# Patient Record
Sex: FEMALE | Race: WHITE | NOT HISPANIC OR LATINO | Employment: UNEMPLOYED | URBAN - METROPOLITAN AREA
[De-identification: names, ages, dates, MRNs, and addresses within clinical notes are randomized per-mention and may not be internally consistent; named-entity substitution may affect disease eponyms.]

---

## 2019-02-20 ENCOUNTER — HOSPITAL ENCOUNTER (OUTPATIENT)
Dept: URGENT CARE | Facility: CLINIC | Age: 24
Discharge: HOME | End: 2019-02-20
Attending: FAMILY MEDICINE

## 2020-03-16 ENCOUNTER — HOSPITAL ENCOUNTER (OUTPATIENT)
Dept: URGENT CARE | Facility: CLINIC | Age: 25
Discharge: HOME | End: 2020-03-16
Attending: PHYSICIAN ASSISTANT

## 2023-07-10 PROBLEM — O34.00 BICORNUATE UTERUS AFFECTING PREGNANCY, ANTEPARTUM: Status: ACTIVE | Noted: 2023-07-10

## 2023-07-10 PROBLEM — Z34.80 SUPERVISION OF OTHER NORMAL PREGNANCY, ANTEPARTUM: Status: ACTIVE | Noted: 2023-07-10

## 2023-07-10 PROBLEM — Q51.3 BICORNUATE UTERUS AFFECTING PREGNANCY, ANTEPARTUM: Status: ACTIVE | Noted: 2023-07-10

## 2023-07-10 PROBLEM — O09.299 HX OF PREECLAMPSIA, PRIOR PREGNANCY, CURRENTLY PREGNANT: Status: ACTIVE | Noted: 2023-07-10

## 2023-07-10 PROBLEM — Z72.0 TOBACCO ABUSE: Status: ACTIVE | Noted: 2023-07-10

## 2023-08-10 ENCOUNTER — ROUTINE PRENATAL (OUTPATIENT)
Dept: OBSTETRICS AND GYNECOLOGY | Facility: CLINIC | Age: 28
End: 2023-08-10
Payer: COMMERCIAL

## 2023-08-10 VITALS — WEIGHT: 158.8 LBS | DIASTOLIC BLOOD PRESSURE: 79 MMHG | BODY MASS INDEX: 28.13 KG/M2 | SYSTOLIC BLOOD PRESSURE: 119 MMHG

## 2023-08-10 DIAGNOSIS — K59.00 CONSTIPATION, UNSPECIFIED CONSTIPATION TYPE: ICD-10-CM

## 2023-08-10 DIAGNOSIS — O34.00 BICORNUATE UTERUS AFFECTING PREGNANCY, ANTEPARTUM: ICD-10-CM

## 2023-08-10 DIAGNOSIS — R11.2 NAUSEA AND VOMITING, UNSPECIFIED VOMITING TYPE: ICD-10-CM

## 2023-08-10 DIAGNOSIS — Z72.0 TOBACCO ABUSE: ICD-10-CM

## 2023-08-10 DIAGNOSIS — Q51.3 BICORNUATE UTERUS AFFECTING PREGNANCY, ANTEPARTUM: ICD-10-CM

## 2023-08-10 DIAGNOSIS — Z34.90 ENCOUNTER FOR SUPERVISION OF NORMAL PREGNANCY, ANTEPARTUM, UNSPECIFIED GRAVIDITY: Primary | ICD-10-CM

## 2023-08-10 DIAGNOSIS — O09.299 HX OF PREECLAMPSIA, PRIOR PREGNANCY, CURRENTLY PREGNANT: ICD-10-CM

## 2023-08-10 LAB
GLUCOSE UR STRIP-MCNC: NEGATIVE MG/DL
PROT UR STRIP-MCNC: NEGATIVE MG/DL

## 2023-08-10 RX ORDER — DOCUSATE SODIUM 100 MG/1
100 CAPSULE, LIQUID FILLED ORAL 2 TIMES DAILY PRN
Qty: 30 CAPSULE | Refills: 0 | Status: SHIPPED | OUTPATIENT
Start: 2023-08-10

## 2023-08-10 RX ORDER — ONDANSETRON 4 MG/1
4 TABLET, ORALLY DISINTEGRATING ORAL EVERY 8 HOURS PRN
Qty: 30 TABLET | Refills: 0 | Status: SHIPPED | OUTPATIENT
Start: 2023-08-10

## 2023-08-10 NOTE — PROGRESS NOTES
Routine Prenatal Visit     Subjective  Maylin Abraham is a 28 y.o.  at 10w0d here for her routine OB visit.   She is taking her prenatal vitamins.Reports no loss of fluid or vaginal bleeding. Patient doing well but does complain of some constipation and nausea. Pregnancy complicated by:     Patient Active Problem List   Diagnosis    Supervision of other normal pregnancy, antepartum    Hx of preeclampsia, prior pregnancy, currently pregnant    Tobacco abuse    Bicornuate uterus affecting pregnancy, antepartum         OB History    Para Term  AB Living   3 1 1   1 1   SAB IAB Ectopic Molar Multiple Live Births   1         1      # Outcome Date GA Lbr Jeremiah/2nd Weight Sex Delivery Anes PTL Lv   3 Current            2 SAB 23     SAB      1 Term 20 38w0d  2722 g (6 lb) M Vag-Spont   NOLAN       ROS:   General ROS: negative for - chills or fatigue  Respiratory ROS: negative for - cough or hemoptysis  Cardiovascular ROS: negative for - chest pain or dyspnea on exertion  Genito-Urinary ROS: negative for  change in urinary stream, vaginal discharge   Musculoskeletal ROS: negative for - gait disturbance or joint pain  Dermatological ROS: negative for acne,  dry skin or itching    Objective  Physical Exam:   Vitals:    08/10/23 1504   BP: 119/79       Uterine Size: not examined, US today  FHT: 110-160 BPM    General appearance - alert, well appearing, and in no distress  Mental status - alert, oriented to person, place, and time  Abdomen- Soft, Gravid uterus, non-tender to palpation  Musculoskeletal: negative for - gait disturbance or joint pain  Extremeties: negative swelling or cyanosis   Dermatological: negative rashes or skin lesions     Assessment/Plan  Diagnoses and all orders for this visit:    1. Encounter for supervision of normal pregnancy, antepartum, unspecified  (Primary)  -     POC Urinalysis Dipstick  -     US Ob 14 + Weeks Single or First Gestation; Future  -      [id number] PLUS Core+SCA+ESS - Blood,  -     Inheritest (R) CF/SMA Panel - Blood,; Future  -     Inheritest (R) CF/SMA Panel - Blood,    2. Hx of preeclampsia, prior pregnancy, currently pregnant  Assessment & Plan:  Baseline labs new OB   ASA 81mg Daily       3. Bicornuate uterus affecting pregnancy, antepartum    4. Tobacco abuse    5. Nausea and vomiting, unspecified vomiting type  -     ondansetron ODT (ZOFRAN-ODT) 4 MG disintegrating tablet; Place 1 tablet on the tongue Every 8 (Eight) Hours As Needed for Nausea or Vomiting.  Dispense: 30 tablet; Refill: 0    6. Constipation, unspecified constipation type  -     docusate sodium (Colace) 100 MG capsule; Take 1 capsule by mouth 2 (Two) Times a Day As Needed for Constipation.  Dispense: 30 capsule; Refill: 0            Counseling:   First trimester precautions, bleeding, cramping, nausea and vomiting  Round Ligament Pain:  The uterus has several ligaments which provide support and keep the uterus in place. As the  uterus grows these ligaments are pulled and stretched which often causes sharp stabbing like pain in the inguinal area.   You may find a pregnancy support band helpful. Changing positions may also help. Yoga is a great way to cope with round ligament and low back pain in pregnancy.    Massage may also help with low back pain   Things to Consider at this Point in your Pregnancy:  Some women experience swelling in their feet during pregnancy. Compression stockings may help  Drink plenty of water and stay active   Make sure you are eating frequent small meals, nuts are a wonderful snack to keep with you            Return in about 4 weeks (around 9/7/2023) for Routine OB visit.      We have gone over prenatal care to include the timing and content of visits. I informed her how to contact the office and/or on call person in the event of any problems and encouraged her to do so when she feels it is necessary.  We then spent time answering her questions which  she indicated were answered to her satisfaction.    Megan Rivero DO  8/10/2023 15:17 EDT

## 2023-08-10 NOTE — ASSESSMENT & PLAN NOTE
JOSEFINA finalize:Estimated Date of Delivery: 3/7/24 based on LMP, dating US ordered       Genetic testing (NIPS-Quad)/CF/AFP:  undecided     COVID: recommended   Flu: recommended   Tdap:script 27-36 weeks     Rhogam:     Sterilization:    Anatomy US:  FU US:    EPDS: 2    PROBLEM LIST/PLAN:   Tobacco abuse-cessation counseled   Bicornuate uterus   Hx of preeclampsia-81mg ASA at 12 weeks, baseline labs

## 2023-08-10 NOTE — PATIENT INSTRUCTIONS
Venipuncture Blood Specimen Collection  Venipuncture performed in left arm by Denise Sahu with good hemostasis. Patient tolerated the procedure well without complications.   08/10/23   Denise Sahu

## 2023-08-24 LAB
CITATION REF LAB TEST: NORMAL
ETHNIC BACKGROUND STATED: NORMAL
GENE DIS ANL CARRIER INTERP-IMP: NORMAL
GENE STUDIED ID: NORMAL
LAB DIRECTOR NAME PROVIDER: NORMAL
Lab: NORMAL
MOL DX INTERP BLD/T QL: NORMAL
REASON FOR REFERRAL (NARRATIVE): NORMAL
RECOMMENDATION PATIENT DOC-IMP: NORMAL
REF LAB TEST METHOD: NORMAL
SERVICE CMNT-IMP: NORMAL
SPECIMEN SOURCE: NORMAL

## 2023-09-07 ENCOUNTER — ROUTINE PRENATAL (OUTPATIENT)
Dept: OBSTETRICS AND GYNECOLOGY | Facility: CLINIC | Age: 28
End: 2023-09-07
Payer: COMMERCIAL

## 2023-09-07 ENCOUNTER — REFERRAL TRIAGE (OUTPATIENT)
Dept: LABOR AND DELIVERY | Facility: HOSPITAL | Age: 28
End: 2023-09-07
Payer: COMMERCIAL

## 2023-09-07 VITALS — WEIGHT: 154 LBS | SYSTOLIC BLOOD PRESSURE: 118 MMHG | BODY MASS INDEX: 27.28 KG/M2 | DIASTOLIC BLOOD PRESSURE: 72 MMHG

## 2023-09-07 DIAGNOSIS — R11.2 NAUSEA AND VOMITING, UNSPECIFIED VOMITING TYPE: ICD-10-CM

## 2023-09-07 DIAGNOSIS — Z34.80 SUPERVISION OF OTHER NORMAL PREGNANCY, ANTEPARTUM: ICD-10-CM

## 2023-09-07 DIAGNOSIS — O34.00 BICORNUATE UTERUS AFFECTING PREGNANCY, ANTEPARTUM: ICD-10-CM

## 2023-09-07 DIAGNOSIS — Q51.3 BICORNUATE UTERUS AFFECTING PREGNANCY, ANTEPARTUM: ICD-10-CM

## 2023-09-07 DIAGNOSIS — Z72.0 TOBACCO ABUSE: ICD-10-CM

## 2023-09-07 DIAGNOSIS — Z34.90 ENCOUNTER FOR SUPERVISION OF NORMAL PREGNANCY, ANTEPARTUM, UNSPECIFIED GRAVIDITY: Primary | ICD-10-CM

## 2023-09-07 DIAGNOSIS — O09.299 HX OF PREECLAMPSIA, PRIOR PREGNANCY, CURRENTLY PREGNANT: ICD-10-CM

## 2023-09-07 LAB
GLUCOSE UR STRIP-MCNC: NEGATIVE MG/DL
PROT UR STRIP-MCNC: ABNORMAL MG/DL

## 2023-09-07 RX ORDER — ONDANSETRON 8 MG/1
8 TABLET, ORALLY DISINTEGRATING ORAL EVERY 8 HOURS PRN
Qty: 30 TABLET | Refills: 3 | Status: SHIPPED | OUTPATIENT
Start: 2023-09-07

## 2023-09-07 RX ORDER — ALBUTEROL SULFATE 90 UG/1
AEROSOL, METERED RESPIRATORY (INHALATION)
COMMUNITY
Start: 2023-08-22

## 2023-09-07 NOTE — PROGRESS NOTES
Routine Prenatal Visit     Subjective  Maylin Abraham is a 28 y.o.  at 14w0d here for her routine OB visit.   She is taking her prenatal vitamins.Reports no loss of fluid or vaginal bleeding. Patient doing well without any complaints. Pregnancy complicated by:     Patient Active Problem List   Diagnosis    Supervision of other normal pregnancy, antepartum    Hx of preeclampsia, prior pregnancy, currently pregnant    Tobacco abuse    Bicornuate uterus affecting pregnancy, antepartum         OB History    Para Term  AB Living   3 1 1   1 1   SAB IAB Ectopic Molar Multiple Live Births   1         1      # Outcome Date GA Lbr Jeremiah/2nd Weight Sex Delivery Anes PTL Lv   3 Current            2 SAB 23     SAB      1 Term 20 38w0d  2722 g (6 lb) M Vag-Spont   NOLAN       ROS:   General ROS: negative for - chills or fatigue  Respiratory ROS: negative for - cough or hemoptysis  Cardiovascular ROS: negative for - chest pain or dyspnea on exertion  Genito-Urinary ROS: negative for  change in urinary stream, vaginal discharge   Musculoskeletal ROS: negative for - gait disturbance or joint pain  Dermatological ROS: negative for acne,  dry skin or itching    Objective  Physical Exam:   Vitals:    23 1432   BP: 118/72       Uterine Size: not examined, US today  FHT: 110-160 BPM via BSUS     General appearance - alert, well appearing, and in no distress  Mental status - alert, oriented to person, place, and time  Abdomen- Soft, Gravid uterus, non-tender to palpation  Musculoskeletal: negative for - gait disturbance or joint pain  Extremeties: negative swelling or cyanosis   Dermatological: negative rashes or skin lesions       Assessment/Plan  Diagnoses and all orders for this visit:    1. Encounter for supervision of normal pregnancy, antepartum, unspecified  (Primary)  -     POC Urinalysis Dipstick    2. Bicornuate uterus affecting pregnancy, antepartum    3. Hx of preeclampsia,  prior pregnancy, currently pregnant    4. Supervision of other normal pregnancy, antepartum  Assessment & Plan:  JOSEFINA finalize:Estimated Date of Delivery: 3/7/24 based on LMP, dating US ordered       Genetic testing (NIPS-Quad)/CF/AFP:  undecided     COVID: recommended   Flu: recommended   Tdap:script 27-36 weeks     Rhogam: A positive     Sterilization:    Anatomy US:  FU US:    EPDS: 2    PROBLEM LIST/PLAN:   Tobacco abuse-cessation counseled   Bicornuate uterus   Hx of preeclampsia-81mg ASA at 12 weeks, baseline labs       5. Tobacco abuse    6. Nausea and vomiting, unspecified vomiting type  -     ondansetron ODT (ZOFRAN-ODT) 8 MG disintegrating tablet; Place 1 tablet on the tongue Every 8 (Eight) Hours As Needed for Nausea or Vomiting.  Dispense: 30 tablet; Refill: 3            Counseling:   First trimester precautions, bleeding, cramping, nausea and vomiting  Round Ligament Pain:  The uterus has several ligaments which provide support and keep the uterus in place. As the  uterus grows these ligaments are pulled and stretched which often causes sharp stabbing like pain in the inguinal area.   You may find a pregnancy support band helpful. Changing positions may also help. Yoga is a great way to cope with round ligament and low back pain in pregnancy.    Massage may also help with low back pain   Things to Consider at this Point in your Pregnancy:  Some women experience swelling in their feet during pregnancy. Compression stockings may help  Drink plenty of water and stay active   Make sure you are eating frequent small meals, nuts are a wonderful snack to keep with you            Return in about 4 weeks (around 10/5/2023) for Routine OB visit.      We have gone over prenatal care to include the timing and content of visits. I informed her how to contact the office and/or on call person in the event of any problems and encouraged her to do so when she feels it is necessary.  We then spent time answering her  questions which she indicated were answered to her satisfaction.    Megan Rivero, DO  9/7/2023 14:44 EDT

## 2023-09-07 NOTE — ASSESSMENT & PLAN NOTE
JOSEFINA finalize:Estimated Date of Delivery: 3/7/24 based on LMP, dating US ordered       Genetic testing (NIPS-Quad)/CF/AFP:  undecided     COVID: recommended   Flu: recommended   Tdap:script 27-36 weeks     Rhogam: A positive     Sterilization:    Anatomy US:  FU US:    EPDS: 2    PROBLEM LIST/PLAN:   Tobacco abuse-cessation counseled   Bicornuate uterus   Hx of preeclampsia-81mg ASA at 12 weeks, baseline labs

## 2023-09-08 ENCOUNTER — CLINICAL SUPPORT (OUTPATIENT)
Dept: OBSTETRICS AND GYNECOLOGY | Facility: CLINIC | Age: 28
End: 2023-09-08
Payer: COMMERCIAL

## 2023-09-08 ENCOUNTER — TELEPHONE (OUTPATIENT)
Dept: OBSTETRICS AND GYNECOLOGY | Facility: CLINIC | Age: 28
End: 2023-09-08
Payer: COMMERCIAL

## 2023-09-08 DIAGNOSIS — Z34.90 ENCOUNTER FOR SUPERVISION OF NORMAL PREGNANCY, ANTEPARTUM, UNSPECIFIED GRAVIDITY: Primary | ICD-10-CM

## 2023-09-08 LAB
CREAT UR-MCNC: 326.1 MG/DL
PROT ?TM UR-MCNC: 21.6 MG/DL
PROT/CREAT UR: 0.07 MG/G{CREAT}

## 2023-09-08 PROCEDURE — 82570 ASSAY OF URINE CREATININE: CPT | Performed by: OBSTETRICS & GYNECOLOGY

## 2023-09-08 PROCEDURE — 84156 ASSAY OF PROTEIN URINE: CPT | Performed by: OBSTETRICS & GYNECOLOGY

## 2023-09-08 NOTE — TELEPHONE ENCOUNTER
----- Message from Lacie Hubbard sent at 9/8/2023 12:04 PM EDT -----    ----- Message -----  From: Megan Rivero DO  Sent: 9/7/2023   4:21 PM EDT  To: Lacie Hubbard    If we still have this can we send it for [facility] since it was 1+    Electronically signed by:    Megan Rivero DO  09/07/23  16:21 EDT      ----- Message -----  From: Karen Jacob MA  Sent: 9/7/2023   2:48 PM EDT  To: Megan Rivero DO

## 2023-09-08 NOTE — TELEPHONE ENCOUNTER
I called the patient and let her know that you would like for her to come in and leave another urine sample for testing.  However, she lives an hour away and wants to know if this can be done at her next follow up on 10/05/2023?  Please advise.

## 2023-09-08 NOTE — TELEPHONE ENCOUNTER
I called the patient back to let her know this information and she said she found a way to be able to make it up here today so she is on her way now to leave a urine sample. Thanks.

## 2023-09-08 NOTE — PROGRESS NOTES
Patient came in to leave a clean catch urine specimen for the purpose of doing a protein and creatinine ratio per Dr. Rivero.

## 2023-10-04 NOTE — ASSESSMENT & PLAN NOTE
JOSEFINA finalize:Estimated Date of Delivery: 3/7/24 based on LMP, dating US ordered       Genetic testing (NIPS-Quad)/CF/AFP:  undecided     COVID: recommended   Flu: recommended   Tdap:script 27-36 weeks     Rhogam: A positive     Sterilization:    Anatomy US:ORDERED  FU US:    EPDS: 2    PROBLEM LIST/PLAN:   Tobacco abuse-cessation counseled   Bicornuate uterus   Hx of preeclampsia-81mg ASA at 12 weeks, baseline labs

## 2023-10-04 NOTE — PROGRESS NOTES
Routine Prenatal Visit     Subjective  Maylin Abraham is a 28 y.o.  at 18w0d here for her routine OB visit.   She is taking her prenatal vitamins.Reports no loss of fluid or vaginal bleeding. Patient doing well without any complaints. Pregnancy complicated by:     Patient Active Problem List   Diagnosis    Supervision of other normal pregnancy, antepartum    Hx of preeclampsia, prior pregnancy, currently pregnant    Tobacco abuse    Bicornuate uterus affecting pregnancy, antepartum         OB History    Para Term  AB Living   3 1 1   1 1   SAB IAB Ectopic Molar Multiple Live Births   1         1      # Outcome Date GA Lbr Jeremiah/2nd Weight Sex Delivery Anes PTL Lv   3 Current            2 SAB 23     SAB      1 Term 20 38w0d  2722 g (6 lb) M Vag-Spont   NOLAN           ROS:   General ROS: negative for - chills or fatigue  Respiratory ROS: negative for - cough or hemoptysis  Cardiovascular ROS: negative for - chest pain or dyspnea on exertion  Genito-Urinary ROS: negative for  change in urinary stream, vaginal discharge   Musculoskeletal ROS: negative for - gait disturbance or joint pain  Dermatological ROS: negative for acne,  dry skin or itching    Objective  Physical Exam:   Vitals:    10/05/23 1410   BP: 110/78     FHT: 110-160 BPM    General appearance - alert, well appearing, and in no distress  Mental status - alert, oriented to person, place, and time  Abdomen- Soft, Gravid uterus, non-tender to palpation  Musculoskeletal: negative for - gait disturbance or joint pain  Extremeties: negative swelling or cyanosis   Dermatological: negative rashes or skin lesions     Assessment/Plan:   Diagnoses and all orders for this visit:    1. Encounter for supervision of normal pregnancy, antepartum, unspecified  (Primary)  -     POC Urinalysis Dipstick    2. Hx of preeclampsia, prior pregnancy, currently pregnant    3. Supervision of other normal pregnancy, antepartum  Assessment &  Plan:  JOSEFINA finalize:Estimated Date of Delivery: 3/7/24 based on LMP, dating US ordered       Genetic testing (NIPS-Quad)/CF/AFP:  undecided     COVID: recommended   Flu: recommended   Tdap:script 27-36 weeks     Rhogam: A positive     Sterilization:    Anatomy US:ORDERED  FU US:    EPDS: 2    PROBLEM LIST/PLAN:   Tobacco abuse-cessation counseled   Bicornuate uterus   Hx of preeclampsia-81mg ASA at 12 weeks, baseline labs       4. Tobacco abuse    5. Bicornuate uterus affecting pregnancy, antepartum  Assessment & Plan:  US on 8/10/23 showed possible bicornuate uterus               Counseling:   Second trimester precautions  Round Ligament Pain:  The uterus has several ligaments which provide support and keep the uterus in place. As the  uterus grows these ligaments are pulled and stretched which often causes sharp stabbing like pain in the inguinal area.   You may find a pregnancy support band helpful. Changing positions may also help. Yoga is a great way to cope with round ligament and low back pain in pregnancy.    Massage may also help with low back pain   Things to Consider at this Point in your Pregnancy:  Some women experience swelling in their feet during pregnancy. Compression stockings may help  Drink plenty of water and stay active   Make sure you are eating frequent small meals, nuts are a wonderful snack to keep with you            Return in about 4 weeks (around 11/2/2023) for Routine OB visit.      We have gone over prenatal care to include the timing and content of visits. I informed her how to contact the office and/or on call person in the event of any problems and encouraged her to do so when she feels it is necessary.  We then spent time answering her questions which she indicated were answered to her satisfaction.    Megan Rivero DO  10/5/2023 14:55 EDT

## 2023-10-05 ENCOUNTER — ROUTINE PRENATAL (OUTPATIENT)
Dept: OBSTETRICS AND GYNECOLOGY | Facility: CLINIC | Age: 28
End: 2023-10-05
Payer: COMMERCIAL

## 2023-10-05 VITALS — DIASTOLIC BLOOD PRESSURE: 78 MMHG | SYSTOLIC BLOOD PRESSURE: 110 MMHG | BODY MASS INDEX: 27.24 KG/M2 | WEIGHT: 153.8 LBS

## 2023-10-05 DIAGNOSIS — O09.299 HX OF PREECLAMPSIA, PRIOR PREGNANCY, CURRENTLY PREGNANT: ICD-10-CM

## 2023-10-05 DIAGNOSIS — O34.00 BICORNUATE UTERUS AFFECTING PREGNANCY, ANTEPARTUM: ICD-10-CM

## 2023-10-05 DIAGNOSIS — Z34.90 ENCOUNTER FOR SUPERVISION OF NORMAL PREGNANCY, ANTEPARTUM, UNSPECIFIED GRAVIDITY: Primary | ICD-10-CM

## 2023-10-05 DIAGNOSIS — Z72.0 TOBACCO ABUSE: ICD-10-CM

## 2023-10-05 DIAGNOSIS — Z34.80 SUPERVISION OF OTHER NORMAL PREGNANCY, ANTEPARTUM: ICD-10-CM

## 2023-10-05 DIAGNOSIS — Q51.3 BICORNUATE UTERUS AFFECTING PREGNANCY, ANTEPARTUM: ICD-10-CM

## 2023-10-05 LAB
GLUCOSE UR STRIP-MCNC: NEGATIVE MG/DL
PROT UR STRIP-MCNC: ABNORMAL MG/DL

## 2023-11-05 NOTE — PROGRESS NOTES
Routine Prenatal Visit     Subjective  Maylin Abraham is a 28 y.o.  at 22w4d here for her routine OB visit.   She is taking her prenatal vitamins.Reports no loss of fluid or vaginal bleeding. Patient doing well but does complain of constant weakness and dizziness and feels like she will pass out at times. It does improve when she lays down and hydrates. We will obtain CBC, CMP and b-12.  Pregnancy is complicated by:     Patient Active Problem List   Diagnosis    Supervision of other normal pregnancy, antepartum    Hx of preeclampsia, prior pregnancy, currently pregnant    Tobacco abuse    Bicornuate uterus affecting pregnancy, antepartum         OB History    Para Term  AB Living   3 1 1   1 1   SAB IAB Ectopic Molar Multiple Live Births   1         1      # Outcome Date GA Lbr Jeremiah/2nd Weight Sex Delivery Anes PTL Lv   3 Current            2 SAB 23     SAB      1 Term 20 38w0d  2722 g (6 lb) M Vag-Spont   NOLAN           ROS:   General ROS: negative for - chills or fatigue  Respiratory ROS: negative for - cough or hemoptysis  Cardiovascular ROS: negative for - chest pain or dyspnea on exertion  Genito-Urinary ROS: negative for  change in urinary stream, vaginal discharge   Musculoskeletal ROS: negative for - gait disturbance or joint pain  Dermatological ROS: negative for acne,  dry skin or itching    Objective  Physical Exam:   Vitals:    23 1546   BP: 116/70       Uterine Size: not examined, US today  FHT: 161 on US, 160 FHTS via doppler    General appearance - alert, well appearing, and in no distress  Mental status - alert, oriented to person, place, and time  Abdomen- Soft, Gravid uterus, non-tender to palpation  Musculoskeletal: negative for - gait disturbance or joint pain  Extremeties: negative swelling or cyanosis   Dermatological: negative rashes or skin lesions       Assessment/Plan:   Diagnoses and all orders for this visit:    1. Encounter for supervision of  normal pregnancy, antepartum, unspecified  (Primary)  -     POC Urinalysis Dipstick    2. Bicornuate uterus affecting pregnancy, antepartum    3. Hx of preeclampsia, prior pregnancy, currently pregnant  Assessment & Plan:  Baseline labs new OB- WNL   ASA 81mg Daily       4. Supervision of other normal pregnancy, antepartum  Assessment & Plan:  JOSEFINA finalize:Estimated Date of Delivery: 3/7/24 based on LMP, dating US ordered       Genetic testing (NIPS-Quad)/CF/AFP: Declined     COVID: recommended   Flu: recommended   Tdap:script 27-36 weeks   RSV: script 32-36 weeks     Rhogam: A positive     Sterilization:None     Anatomy US:ORDERED  FU US:    EPDS: 2    PROBLEM LIST/PLAN:   Tobacco abuse-cessation counseled   Bicornuate uterus   Hx of preeclampsia-81mg ASA at 12 weeks, baseline labs     Orders:  -     US Ob 14 + Weeks Single or First Gestation; Future    5. Tobacco abuse  Assessment & Plan:  Cessation counseled       6. Dizziness  -     Comprehensive Metabolic Panel; Future  -     CBC & Differential; Future  -     Vitamin B12          Counseling:   Second trimester precautions  Round Ligament Pain:  The uterus has several ligaments which provide support and keep the uterus in place. As the  uterus grows these ligaments are pulled and stretched which often causes sharp stabbing like pain in the inguinal area.   You may find a pregnancy support band helpful. Changing positions may also help. Yoga is a great way to cope with round ligament and low back pain in pregnancy.    Massage may also help with low back pain   Things to Consider at this Point in your Pregnancy:  Some women experience swelling in their feet during pregnancy. Compression stockings may help  Drink plenty of water and stay active   Make sure you are eating frequent small meals, nuts are a wonderful snack to keep with you            Return in about 4 weeks (around 2023) for Routine OB visit.      We have gone over prenatal care to  include the timing and content of visits. I informed her how to contact the office and/or on call person in the event of any problems and encouraged her to do so when she feels it is necessary.  We then spent time answering her questions which she indicated were answered to her satisfaction.    Megan Rivero DO  11/6/2023 15:54 EST

## 2023-11-05 NOTE — ASSESSMENT & PLAN NOTE
JOSEFINA finalize:Estimated Date of Delivery: 3/7/24 based on LMP, dating US ordered       Genetic testing (NIPS-Quad)/CF/AFP: Declined     COVID: recommended   Flu: recommended   Tdap:script 27-36 weeks   RSV: script 32-36 weeks     Rhogam: A positive     Sterilization:None     Anatomy US:ORDERED  FU US:    EPDS: 2    PROBLEM LIST/PLAN:   Tobacco abuse-cessation counseled   Bicornuate uterus   Hx of preeclampsia-81mg ASA at 12 weeks, baseline labs

## 2023-11-06 ENCOUNTER — ROUTINE PRENATAL (OUTPATIENT)
Dept: OBSTETRICS AND GYNECOLOGY | Facility: CLINIC | Age: 28
End: 2023-11-06
Payer: COMMERCIAL

## 2023-11-06 VITALS — DIASTOLIC BLOOD PRESSURE: 70 MMHG | WEIGHT: 159.6 LBS | BODY MASS INDEX: 28.27 KG/M2 | SYSTOLIC BLOOD PRESSURE: 116 MMHG

## 2023-11-06 DIAGNOSIS — Z72.0 TOBACCO ABUSE: ICD-10-CM

## 2023-11-06 DIAGNOSIS — O09.299 HX OF PREECLAMPSIA, PRIOR PREGNANCY, CURRENTLY PREGNANT: ICD-10-CM

## 2023-11-06 DIAGNOSIS — Q51.3 BICORNUATE UTERUS AFFECTING PREGNANCY, ANTEPARTUM: ICD-10-CM

## 2023-11-06 DIAGNOSIS — Z34.80 SUPERVISION OF OTHER NORMAL PREGNANCY, ANTEPARTUM: ICD-10-CM

## 2023-11-06 DIAGNOSIS — Z34.90 ENCOUNTER FOR SUPERVISION OF NORMAL PREGNANCY, ANTEPARTUM, UNSPECIFIED GRAVIDITY: Primary | ICD-10-CM

## 2023-11-06 DIAGNOSIS — R42 DIZZINESS: ICD-10-CM

## 2023-11-06 DIAGNOSIS — O34.00 BICORNUATE UTERUS AFFECTING PREGNANCY, ANTEPARTUM: ICD-10-CM

## 2023-11-06 LAB
BASOPHILS # BLD AUTO: 0.06 10*3/MM3 (ref 0–0.2)
BASOPHILS NFR BLD AUTO: 0.6 % (ref 0–1.5)
DEPRECATED RDW RBC AUTO: 40.4 FL (ref 37–54)
EOSINOPHIL # BLD AUTO: 0.13 10*3/MM3 (ref 0–0.4)
EOSINOPHIL NFR BLD AUTO: 1.2 % (ref 0.3–6.2)
ERYTHROCYTE [DISTWIDTH] IN BLOOD BY AUTOMATED COUNT: 13.1 % (ref 12.3–15.4)
GLUCOSE UR STRIP-MCNC: NEGATIVE MG/DL
HCT VFR BLD AUTO: 31.8 % (ref 34–46.6)
HGB BLD-MCNC: 10.5 G/DL (ref 12–15.9)
IMM GRANULOCYTES # BLD AUTO: 0.1 10*3/MM3 (ref 0–0.05)
IMM GRANULOCYTES NFR BLD AUTO: 0.9 % (ref 0–0.5)
LYMPHOCYTES # BLD AUTO: 2.47 10*3/MM3 (ref 0.7–3.1)
LYMPHOCYTES NFR BLD AUTO: 23.4 % (ref 19.6–45.3)
MCH RBC QN AUTO: 27.7 PG (ref 26.6–33)
MCHC RBC AUTO-ENTMCNC: 33 G/DL (ref 31.5–35.7)
MCV RBC AUTO: 83.9 FL (ref 79–97)
MONOCYTES # BLD AUTO: 0.64 10*3/MM3 (ref 0.1–0.9)
MONOCYTES NFR BLD AUTO: 6.1 % (ref 5–12)
NEUTROPHILS NFR BLD AUTO: 67.8 % (ref 42.7–76)
NEUTROPHILS NFR BLD AUTO: 7.16 10*3/MM3 (ref 1.7–7)
NRBC BLD AUTO-RTO: 0 /100 WBC (ref 0–0.2)
PLATELET # BLD AUTO: 260 10*3/MM3 (ref 140–450)
PMV BLD AUTO: 13 FL (ref 6–12)
PROT UR STRIP-MCNC: NEGATIVE MG/DL
RBC # BLD AUTO: 3.79 10*6/MM3 (ref 3.77–5.28)
WBC NRBC COR # BLD: 10.56 10*3/MM3 (ref 3.4–10.8)

## 2023-11-06 PROCEDURE — 85025 COMPLETE CBC W/AUTO DIFF WBC: CPT | Performed by: OBSTETRICS & GYNECOLOGY

## 2023-11-06 PROCEDURE — 80053 COMPREHEN METABOLIC PANEL: CPT | Performed by: OBSTETRICS & GYNECOLOGY

## 2023-11-06 PROCEDURE — 82607 VITAMIN B-12: CPT | Performed by: OBSTETRICS & GYNECOLOGY

## 2023-11-06 NOTE — PATIENT INSTRUCTIONS
Venipuncture Blood Specimen Collection  Venipuncture performed in left arm by Cee Barboza with good hemostasis. Patient tolerated the procedure well without complications.   11/06/23   Cee Barboza

## 2023-11-07 LAB
ALBUMIN SERPL-MCNC: 3.7 G/DL (ref 3.5–5.2)
ALBUMIN/GLOB SERPL: 1.3 G/DL
ALP SERPL-CCNC: 58 U/L (ref 39–117)
ALT SERPL W P-5'-P-CCNC: 9 U/L (ref 1–33)
ANION GAP SERPL CALCULATED.3IONS-SCNC: 9.3 MMOL/L (ref 5–15)
AST SERPL-CCNC: 17 U/L (ref 1–32)
BILIRUB SERPL-MCNC: <0.2 MG/DL (ref 0–1.2)
BUN SERPL-MCNC: 5 MG/DL (ref 6–20)
BUN/CREAT SERPL: 11.9 (ref 7–25)
CALCIUM SPEC-SCNC: 9 MG/DL (ref 8.6–10.5)
CHLORIDE SERPL-SCNC: 102 MMOL/L (ref 98–107)
CO2 SERPL-SCNC: 25.7 MMOL/L (ref 22–29)
CREAT SERPL-MCNC: 0.42 MG/DL (ref 0.57–1)
EGFRCR SERPLBLD CKD-EPI 2021: 138.5 ML/MIN/1.73
GLOBULIN UR ELPH-MCNC: 2.8 GM/DL
GLUCOSE SERPL-MCNC: 77 MG/DL (ref 65–99)
POTASSIUM SERPL-SCNC: 4 MMOL/L (ref 3.5–5.2)
PROT SERPL-MCNC: 6.5 G/DL (ref 6–8.5)
SODIUM SERPL-SCNC: 137 MMOL/L (ref 136–145)
VIT B12 BLD-MCNC: 270 PG/ML (ref 211–946)

## 2023-11-08 ENCOUNTER — TELEPHONE (OUTPATIENT)
Dept: OBSTETRICS AND GYNECOLOGY | Facility: CLINIC | Age: 28
End: 2023-11-08
Payer: COMMERCIAL

## 2023-11-08 DIAGNOSIS — O99.019 MATERNAL ANEMIA IN PREGNANCY, ANTEPARTUM: Primary | ICD-10-CM

## 2023-11-08 NOTE — TELEPHONE ENCOUNTER
Patient informed of her results and recommendations for iron supplementation. Please sign the attached order for the script to be sent to her pharmacy.

## 2023-11-08 NOTE — TELEPHONE ENCOUNTER
----- Message from Megan Rivero DO sent at 11/7/2023  8:23 AM EST -----  Labs do show anemia. Recommend iron 325mg daily.     Electronically signed by:    Megan Rivero DO  11/07/23  08:23 EST

## 2023-11-09 RX ORDER — FERROUS SULFATE 325(65) MG
325 TABLET ORAL DAILY
Qty: 30 TABLET | Refills: 5 | Status: SHIPPED | OUTPATIENT
Start: 2023-11-09

## 2023-12-03 NOTE — PROGRESS NOTES
Routine Prenatal Visit     Subjective  Maylin Abraham is a 28 y.o.  at 26w4d here for her routine OB visit.   She is taking her prenatal vitamins.Reports no loss of fluid or vaginal bleeding. Patient doing well without any complaints. Pregnancy complicated by:     Patient Active Problem List   Diagnosis    Supervision of other normal pregnancy, antepartum    Hx of preeclampsia, prior pregnancy, currently pregnant    Tobacco abuse    Bicornuate uterus affecting pregnancy, antepartum         OB History    Para Term  AB Living   3 1 1   1 1   SAB IAB Ectopic Molar Multiple Live Births   1         1      # Outcome Date GA Lbr Jeremiah/2nd Weight Sex Delivery Anes PTL Lv   3 Current            2 SAB 23     SAB      1 Term 20 38w0d  2722 g (6 lb) M Vag-Spont   NOLAN           ROS:   General ROS: negative for - chills or fatigue  Respiratory ROS: negative for - cough or hemoptysis  Cardiovascular ROS: negative for - chest pain or dyspnea on exertion  Genito-Urinary ROS: negative for  change in urinary stream, vaginal discharge   Musculoskeletal ROS: negative for - gait disturbance or joint pain  Dermatological ROS: negative for acne,  dry skin or itching    Objective  Physical Exam:   Vitals:    23 1552   BP: 124/68       Uterine Size: not examined, US today  FHT: 110-160 BPM    General appearance - alert, well appearing, and in no distress  Mental status - alert, oriented to person, place, and time  Abdomen- Soft, Gravid uterus, non-tender to palpation  Musculoskeletal: negative for - gait disturbance or joint pain  Extremeties: negative swelling or cyanosis   Dermatological: negative rashes or skin lesions       Assessment/Plan:   Diagnoses and all orders for this visit:    1. Encounter for supervision of normal pregnancy, antepartum, unspecified  (Primary)  -     POC Urinalysis Dipstick  -     CBC (No Diff)  -     Gestational Diabetes Screen 1 Hour    2. Bicornuate uterus  affecting pregnancy, antepartum    3. Hx of preeclampsia, prior pregnancy, currently pregnant  Assessment & Plan:  Baseline labs new OB- WNL   ASA 81mg Daily       4. Supervision of other normal pregnancy, antepartum  Assessment & Plan:  PROBLEM LIST/PLAN:   Tobacco abuse-cessation counseled   Bicornuate uterus   Hx of preeclampsia-81mg ASA at 12 weeks, baseline labs     Orders:  -     US Ob 14 + Weeks Single or First Gestation; Future    5. Tobacco abuse            Counseling:   Second trimester precautions  Round Ligament Pain:  The uterus has several ligaments which provide support and keep the uterus in place. As the  uterus grows these ligaments are pulled and stretched which often causes sharp stabbing like pain in the inguinal area.   You may find a pregnancy support band helpful. Changing positions may also help. Yoga is a great way to cope with round ligament and low back pain in pregnancy.    Massage may also help with low back pain   Things to Consider at this Point in your Pregnancy:  Some women experience swelling in their feet during pregnancy. Compression stockings may help  Drink plenty of water and stay active   Make sure you are eating frequent small meals, nuts are a wonderful snack to keep with you            Return in about 4 weeks (around 1/1/2024) for Routine OB visit.      We have gone over prenatal care to include the timing and content of visits. I informed her how to contact the office and/or on call person in the event of any problems and encouraged her to do so when she feels it is necessary.  We then spent time answering her questions which she indicated were answered to her satisfaction.    Megan Rivero,   12/4/2023 15:53 EST

## 2023-12-03 NOTE — ASSESSMENT & PLAN NOTE
PROBLEM LIST/PLAN:   Tobacco abuse-cessation counseled   Bicornuate uterus   Hx of preeclampsia-81mg ASA at 12 weeks, baseline labs

## 2023-12-04 ENCOUNTER — ROUTINE PRENATAL (OUTPATIENT)
Dept: OBSTETRICS AND GYNECOLOGY | Facility: CLINIC | Age: 28
End: 2023-12-04
Payer: COMMERCIAL

## 2023-12-04 ENCOUNTER — PATIENT OUTREACH (OUTPATIENT)
Dept: LABOR AND DELIVERY | Facility: HOSPITAL | Age: 28
End: 2023-12-04
Payer: COMMERCIAL

## 2023-12-04 VITALS — WEIGHT: 161.2 LBS | BODY MASS INDEX: 28.56 KG/M2 | SYSTOLIC BLOOD PRESSURE: 124 MMHG | DIASTOLIC BLOOD PRESSURE: 68 MMHG

## 2023-12-04 DIAGNOSIS — O09.299 HX OF PREECLAMPSIA, PRIOR PREGNANCY, CURRENTLY PREGNANT: ICD-10-CM

## 2023-12-04 DIAGNOSIS — Z34.80 SUPERVISION OF OTHER NORMAL PREGNANCY, ANTEPARTUM: ICD-10-CM

## 2023-12-04 DIAGNOSIS — Z34.90 ENCOUNTER FOR SUPERVISION OF NORMAL PREGNANCY, ANTEPARTUM, UNSPECIFIED GRAVIDITY: Primary | ICD-10-CM

## 2023-12-04 DIAGNOSIS — O34.00 BICORNUATE UTERUS AFFECTING PREGNANCY, ANTEPARTUM: ICD-10-CM

## 2023-12-04 DIAGNOSIS — Q51.3 BICORNUATE UTERUS AFFECTING PREGNANCY, ANTEPARTUM: ICD-10-CM

## 2023-12-04 DIAGNOSIS — Z72.0 TOBACCO ABUSE: ICD-10-CM

## 2023-12-04 LAB
DEPRECATED RDW RBC AUTO: 39.2 FL (ref 37–54)
ERYTHROCYTE [DISTWIDTH] IN BLOOD BY AUTOMATED COUNT: 12.9 % (ref 12.3–15.4)
GLUCOSE 1H P GLC SERPL-MCNC: 82 MG/DL (ref 65–139)
GLUCOSE UR STRIP-MCNC: NEGATIVE MG/DL
HCT VFR BLD AUTO: 30.7 % (ref 34–46.6)
HGB BLD-MCNC: 10.2 G/DL (ref 12–15.9)
MCH RBC QN AUTO: 27.7 PG (ref 26.6–33)
MCHC RBC AUTO-ENTMCNC: 33.2 G/DL (ref 31.5–35.7)
MCV RBC AUTO: 83.4 FL (ref 79–97)
PLATELET # BLD AUTO: 271 10*3/MM3 (ref 140–450)
PMV BLD AUTO: 12.8 FL (ref 6–12)
PROT UR STRIP-MCNC: NEGATIVE MG/DL
RBC # BLD AUTO: 3.68 10*6/MM3 (ref 3.77–5.28)
WBC NRBC COR # BLD AUTO: 13.54 10*3/MM3 (ref 3.4–10.8)

## 2023-12-04 PROCEDURE — 85027 COMPLETE CBC AUTOMATED: CPT | Performed by: OBSTETRICS & GYNECOLOGY

## 2023-12-04 PROCEDURE — 82950 GLUCOSE TEST: CPT | Performed by: OBSTETRICS & GYNECOLOGY

## 2023-12-04 PROCEDURE — 99214 OFFICE O/P EST MOD 30 MIN: CPT | Performed by: OBSTETRICS & GYNECOLOGY

## 2023-12-04 NOTE — OUTREACH NOTE
[facility]  Enrollment    Current Estimated Gestational Age: 26w4d    Questions/Answers      Flowsheet Row Responses   Would like to participate? 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 No Barriers   What is the preferred language of the learner for medical teaching? English   Is an  required? No            Met with patient and Juwan at Ob office visit. Doing well. Denies any concerns with food, housing or transportation. Encouraged to reach out for any concerns.     Tobacco, Alcohol, and Drug History     reports that she has quit smoking. Her smoking use included cigarettes. She smoked an average of .5 packs per day. She has been exposed to tobacco smoke. She has never used smokeless tobacco.   reports that she does not currently use alcohol.   reports that she does not currently use drugs after having used the following drugs: Marijuana.    Sydnee Petit RN  Maternity Nurse Navigator    12/4/2023, 15:57 EST

## 2023-12-04 NOTE — PATIENT INSTRUCTIONS
Venipuncture Blood Specimen Collection  Venipuncture performed in left arm by Denise Sahu with good hemostasis. Patient tolerated the procedure well without complications.   12/04/23   Denise Sahu

## 2023-12-05 ENCOUNTER — TELEPHONE (OUTPATIENT)
Dept: OBSTETRICS AND GYNECOLOGY | Facility: CLINIC | Age: 28
End: 2023-12-05
Payer: COMMERCIAL

## 2023-12-11 ENCOUNTER — PATIENT MESSAGE (OUTPATIENT)
Dept: OBSTETRICS AND GYNECOLOGY | Facility: CLINIC | Age: 28
End: 2023-12-11
Payer: COMMERCIAL

## 2024-01-04 ENCOUNTER — ROUTINE PRENATAL (OUTPATIENT)
Dept: OBSTETRICS AND GYNECOLOGY | Facility: CLINIC | Age: 29
End: 2024-01-04
Payer: COMMERCIAL

## 2024-01-04 VITALS — DIASTOLIC BLOOD PRESSURE: 74 MMHG | SYSTOLIC BLOOD PRESSURE: 106 MMHG | BODY MASS INDEX: 29.48 KG/M2 | WEIGHT: 166.4 LBS

## 2024-01-04 DIAGNOSIS — O34.00 BICORNUATE UTERUS AFFECTING PREGNANCY, ANTEPARTUM: ICD-10-CM

## 2024-01-04 DIAGNOSIS — Z34.80 SUPERVISION OF OTHER NORMAL PREGNANCY, ANTEPARTUM: Primary | ICD-10-CM

## 2024-01-04 DIAGNOSIS — O09.299 HX OF PREECLAMPSIA, PRIOR PREGNANCY, CURRENTLY PREGNANT: ICD-10-CM

## 2024-01-04 DIAGNOSIS — Q51.3 BICORNUATE UTERUS AFFECTING PREGNANCY, ANTEPARTUM: ICD-10-CM

## 2024-01-04 DIAGNOSIS — Z72.0 TOBACCO ABUSE: ICD-10-CM

## 2024-01-04 LAB
GLUCOSE UR STRIP-MCNC: NEGATIVE MG/DL
PROT UR STRIP-MCNC: NEGATIVE MG/DL

## 2024-01-04 NOTE — PROGRESS NOTES
Routine Prenatal Visit     Subjective  Maylin Abraham is a 28 y.o.  at 31w0d here for her routine OB visit.   She is taking her prenatal vitamins.Reports no loss of fluid or vaginal bleeding. Patient doing well without any complaints. Pregnancy complicated by:     Patient Active Problem List   Diagnosis    Supervision of other normal pregnancy, antepartum    Hx of preeclampsia, prior pregnancy, currently pregnant    Tobacco abuse    Bicornuate uterus affecting pregnancy, antepartum         OB History    Para Term  AB Living   3 1 1   1 1   SAB IAB Ectopic Molar Multiple Live Births   1         1      # Outcome Date GA Lbr Jeremiah/2nd Weight Sex Delivery Anes PTL Lv   3 Current            2 SAB 23     SAB      1 Term 20 38w0d  2722 g (6 lb) M Vag-Spont   NOLAN           ROS:   General ROS: negative for - chills or fatigue  Respiratory ROS: negative for - cough or hemoptysis  Cardiovascular ROS: negative for - chest pain or dyspnea on exertion  Genito-Urinary ROS: negative for  change in urinary stream, vaginal discharge   Musculoskeletal ROS: negative for - gait disturbance or joint pain  Dermatological ROS: negative for acne,  dry skin or itching    Objective  Physical Exam:   Vitals:    24 1542   BP: 106/74       Uterine Size: not examined, US today  FHT: 110-160 BPM    General appearance - alert, well appearing, and in no distress  Mental status - alert, oriented to person, place, and time  Abdomen- Soft, Gravid uterus, non-tender to palpation  Musculoskeletal: negative for - gait disturbance or joint pain  Extremeties: negative swelling or cyanosis   Dermatological: negative rashes or skin lesions       Assessment/Plan:   Diagnoses and all orders for this visit:    1. Supervision of other normal pregnancy, antepartum (Primary)  Assessment & Plan:  PROBLEM LIST/PLAN:   Tobacco abuse-cessation counseled   Bicornuate uterus   Hx of preeclampsia-81mg ASA at 12 weeks, baseline labs      Orders:  -     POC Urinalysis Dipstick    2. Bicornuate uterus affecting pregnancy, antepartum    3. Hx of preeclampsia, prior pregnancy, currently pregnant    4. Tobacco abuse            Counseling:   OB precautions, leaking, VB, papo urias vs PTL/Labor  [facility], [place]  HTN precautions reviewed: HA, vision change, RUQ/epigastric pain, edema  Round Ligament Pain:  The uterus has several ligaments which provide support and keep the uterus in place. As the  uterus grows these ligaments are pulled and stretched which often causes sharp stabbing like pain in the inguinal area.   You may find a pregnancy support band helpful. Changing positions may also help. Yoga is a great way to cope with round ligament and low back pain in pregnancy.    Massage may also help with low back pain   Things to Consider at this Point in your Pregnancy:  Some women experience swelling in their feet during pregnancy. Compression stockings may help  Drink plenty of water and stay active   Make sure you are eating frequent small meals, nuts are a wonderful snack to keep with you            Return in about 2 weeks (around 1/18/2024) for Routine OB visit.      We have gone over prenatal care to include the timing and content of visits. I informed her how to contact the office and/or on call person in the event of any problems and encouraged her to do so when she feels it is necessary.  We then spent time answering her questions which she indicated were answered to her satisfaction.    Megan Rivero,   1/4/2024 15:47 EST

## 2024-01-17 ENCOUNTER — PATIENT OUTREACH (OUTPATIENT)
Dept: LABOR AND DELIVERY | Facility: HOSPITAL | Age: 29
End: 2024-01-17
Payer: COMMERCIAL

## 2024-01-17 ENCOUNTER — ROUTINE PRENATAL (OUTPATIENT)
Dept: OBSTETRICS AND GYNECOLOGY | Facility: CLINIC | Age: 29
End: 2024-01-17
Payer: COMMERCIAL

## 2024-01-17 VITALS — WEIGHT: 167.6 LBS | SYSTOLIC BLOOD PRESSURE: 113 MMHG | BODY MASS INDEX: 29.69 KG/M2 | DIASTOLIC BLOOD PRESSURE: 72 MMHG

## 2024-01-17 DIAGNOSIS — O09.299 HX OF PREECLAMPSIA, PRIOR PREGNANCY, CURRENTLY PREGNANT: ICD-10-CM

## 2024-01-17 DIAGNOSIS — O34.00 BICORNUATE UTERUS AFFECTING PREGNANCY, ANTEPARTUM: ICD-10-CM

## 2024-01-17 DIAGNOSIS — Z34.80 SUPERVISION OF OTHER NORMAL PREGNANCY, ANTEPARTUM: Primary | ICD-10-CM

## 2024-01-17 DIAGNOSIS — Z72.0 TOBACCO ABUSE: ICD-10-CM

## 2024-01-17 DIAGNOSIS — Q51.3 BICORNUATE UTERUS AFFECTING PREGNANCY, ANTEPARTUM: ICD-10-CM

## 2024-01-17 LAB
GLUCOSE UR STRIP-MCNC: NEGATIVE MG/DL
PROT UR STRIP-MCNC: NEGATIVE MG/DL

## 2024-01-17 NOTE — OUTREACH NOTE
[facility] EMERGENCY DEPARTMENT    Thank you for your patience today.     You have been seen and evaluated for abdominal pain with diagnosis of gallstones.     Please read the instructions provided  If given prescriptions, take as instructed    Try to stay well hydrated, and sip small amounts of liquids frequently if you are having any issues.     Remember, your care process does not end after your visit today. Please follow-up with your doctor within 1-2 days for a follow-up check to ensure you are  improving, to see if you need any further evaluation/testing, or to evaluate for any alternate diagnoses.     Please return to the emergency department if you develop symptoms that may include worsening abdominal pain, persistent nausea and vomiting to the point where you are unable to keep down fluids, if you develop chest pain or difficulty breathing, bleeding, dizziness or lightheadedness, or if you develop any other new or concerning symptoms as these could be signs of more serious medical illness.    We hope you feel better.      [facility]  Check-In    Current Estimated Gestational Age: 32w6d      Questions/Answers      Flowsheet Row Responses   Best Method for Contacting Cell   Currently Employed Yes   Able to keep appointments as scheduled Yes   Gender(s) and Name(s) Girl - undecided   Baby Active/Feeling Fetal Movemen Yes   Supplies ready for baby Breast Pump, Car Seat, Clothing, Crib, Diapers, Feeding Supplies   Resource/Environmental Concerns None   Do you have any questions related to your care experience, your pregnancy, plans for delivery, any concerns, etc? No   Other Education How to find a pediatrician            Sydnee Petit RN  Maternity Nurse Navigator    1/17/2024, 14:25 EST

## 2024-01-17 NOTE — PROGRESS NOTES
Routine Prenatal Visit     Subjective  Maylin Abraham is a 28 y.o.  at 32w6d here for her routine OB visit.   She is taking her prenatal vitamins.Reports no loss of fluid or vaginal bleeding. Patient doing well without any complaints. Pregnancy complicated by:     Patient Active Problem List   Diagnosis    Supervision of other normal pregnancy, antepartum    Hx of preeclampsia, prior pregnancy, currently pregnant    Tobacco abuse    Bicornuate uterus affecting pregnancy, antepartum    Anemia of mother during pregnancy, delivered         OB History    Para Term  AB Living   3 1 1   1 1   SAB IAB Ectopic Molar Multiple Live Births   1         1      # Outcome Date GA Lbr Jeremiah/2nd Weight Sex Delivery Anes PTL Lv   3 Current            2 SAB 23     SAB      1 Term 20 38w0d  2722 g (6 lb) M Vag-Spont   NOLAN           ROS:   General ROS: negative for - chills or fatigue  Respiratory ROS: negative for - cough or hemoptysis  Cardiovascular ROS: negative for - chest pain or dyspnea on exertion  Genito-Urinary ROS: negative for  change in urinary stream, vaginal discharge   Musculoskeletal ROS: negative for - gait disturbance or joint pain  Dermatological ROS: negative for acne,  dry skin or itching    Objective  Physical Exam:   Vitals:    24 1417   BP: 113/72       Uterine Size: size equals dates  FHT: 110-160 BPM    General appearance - alert, well appearing, and in no distress  Mental status - alert, oriented to person, place, and time  Abdomen- Soft, Gravid uterus, non-tender to palpation  Musculoskeletal: negative for - gait disturbance or joint pain  Extremeties: negative swelling or cyanosis   Dermatological: negative rashes or skin lesions       Assessment/Plan:   Diagnoses and all orders for this visit:    1. Supervision of other normal pregnancy, antepartum (Primary)  -     POC Urinalysis Dipstick    2. Hx of preeclampsia, prior pregnancy, currently pregnant    3. Bicornuate  uterus affecting pregnancy, antepartum  -     US Ob 14 + Weeks Single or First Gestation; Future    4. Tobacco abuse            Counseling:   OB precautions, leaking, VB, papo urias vs PTL/Labor  [facility]  HTN precautions reviewed: HA, vision change, RUQ/epigastric pain, edema  Round Ligament Pain:  The uterus has several ligaments which provide support and keep the uterus in place. As the  uterus grows these ligaments are pulled and stretched which often causes sharp stabbing like pain in the inguinal area.   You may find a pregnancy support band helpful. Changing positions may also help. Yoga is a great way to cope with round ligament and low back pain in pregnancy.    Massage may also help with low back pain   Things to Consider at this Point in your Pregnancy:  Some women experience swelling in their feet during pregnancy. Compression stockings may help  Drink plenty of water and stay active   Make sure you are eating frequent small meals, nuts are a wonderful snack to keep with you            Return in about 2 weeks (around 1/31/2024) for Routine OB visit.      We have gone over prenatal care to include the timing and content of visits. I informed her how to contact the office and/or on call person in the event of any problems and encouraged her to do so when she feels it is necessary.  We then spent time answering her questions which she indicated were answered to her satisfaction.    Megan Rivero,   1/17/2024 14:41 EST

## 2024-01-18 ENCOUNTER — TELEPHONE (OUTPATIENT)
Dept: OBSTETRICS AND GYNECOLOGY | Facility: CLINIC | Age: 29
End: 2024-01-18
Payer: COMMERCIAL

## 2024-01-18 NOTE — TELEPHONE ENCOUNTER
Caller: Maylin Abraham    Relationship to patient: Self    Best call back number: [phone]    Patient is needing: PT RETURN MISSED CALL FROM OFFICE. HUB CONFIRMED APPTS FOR 1/31/24 AT 11:00 AND 11:45 WITH PATIENT

## 2024-01-30 NOTE — PROGRESS NOTES
Routine Prenatal Visit     Subjective  Maylin Abraham is a 28 y.o.  at 34w6d here for her routine OB visit.   She is taking her prenatal vitamins.Reports no loss of fluid or vaginal bleeding. Patient doing well without any complaints. Pregnancy complicated by:     Patient Active Problem List   Diagnosis    Supervision of other normal pregnancy, antepartum    Hx of preeclampsia, prior pregnancy, currently pregnant    Tobacco abuse    Bicornuate uterus affecting pregnancy, antepartum    Anemia of mother during pregnancy, delivered         OB History    Para Term  AB Living   3 1 1   1 1   SAB IAB Ectopic Molar Multiple Live Births   1         1      # Outcome Date GA Lbr Jeremiah/2nd Weight Sex Delivery Anes PTL Lv   3 Current            2 SAB 23     SAB      1 Term 20 38w0d  2722 g (6 lb) M Vag-Spont   NOLAN           ROS:   General ROS: negative for - chills or fatigue  Respiratory ROS: negative for - cough or hemoptysis  Cardiovascular ROS: negative for - chest pain or dyspnea on exertion  Genito-Urinary ROS: negative for  change in urinary stream, vaginal discharge   Musculoskeletal ROS: negative for - gait disturbance or joint pain  Dermatological ROS: negative for acne,  dry skin or itching    Objective  Physical Exam:   Vitals:    24 1137   BP: 116/70       Uterine Size: not examined, US today  FHT: 110-160 BPM    General appearance - alert, well appearing, and in no distress  Mental status - alert, oriented to person, place, and time  Abdomen- Soft, Gravid uterus, non-tender to palpation  Musculoskeletal: negative for - gait disturbance or joint pain  Extremeties: negative swelling or cyanosis   Dermatological: negative rashes or skin lesions       Assessment/Plan:   Diagnoses and all orders for this visit:    1. Supervision of other normal pregnancy, antepartum (Primary)  Assessment & Plan:    PROBLEM LIST/PLAN:   Tobacco abuse-cessation counseled   Bicornuate uterus   Hx of  preeclampsia-81mg ASA at 12 weeks, baseline labs     Orders:  -     POC Urinalysis Dipstick    2. Hx of preeclampsia, prior pregnancy, currently pregnant    3. Bicornuate uterus affecting pregnancy, antepartum    4. Anemia of mother during pregnancy, delivered    5. Tobacco abuse    6. Gestational proteinuria, antepartum  -     Protein / Creatinine Ratio, Urine - Urine, Clean Catch            Counseling:   OB precautions, leaking, VB, papo urias vs PTL/Labor  [facility]  HTN precautions reviewed: HA, vision change, RUQ/epigastric pain, edema  Round Ligament Pain:  The uterus has several ligaments which provide support and keep the uterus in place. As the  uterus grows these ligaments are pulled and stretched which often causes sharp stabbing like pain in the inguinal area.   You may find a pregnancy support band helpful. Changing positions may also help. Yoga is a great way to cope with round ligament and low back pain in pregnancy.    Massage may also help with low back pain   Things to Consider at this Point in your Pregnancy:  Some women experience swelling in their feet during pregnancy. Compression stockings may help  Drink plenty of water and stay active   Make sure you are eating frequent small meals, nuts are a wonderful snack to keep with you            Return in about 2 weeks (around 2/14/2024).      We have gone over prenatal care to include the timing and content of visits. I informed her how to contact the office and/or on call person in the event of any problems and encouraged her to do so when she feels it is necessary.  We then spent time answering her questions which she indicated were answered to her satisfaction.    Megan Rivero,   1/31/2024 12:02 EST

## 2024-01-31 ENCOUNTER — ROUTINE PRENATAL (OUTPATIENT)
Dept: OBSTETRICS AND GYNECOLOGY | Facility: CLINIC | Age: 29
End: 2024-01-31
Payer: COMMERCIAL

## 2024-01-31 VITALS — DIASTOLIC BLOOD PRESSURE: 70 MMHG | SYSTOLIC BLOOD PRESSURE: 116 MMHG | WEIGHT: 172.4 LBS | BODY MASS INDEX: 30.54 KG/M2

## 2024-01-31 DIAGNOSIS — Z34.80 SUPERVISION OF OTHER NORMAL PREGNANCY, ANTEPARTUM: Primary | ICD-10-CM

## 2024-01-31 DIAGNOSIS — O12.10 GESTATIONAL PROTEINURIA, ANTEPARTUM: ICD-10-CM

## 2024-01-31 DIAGNOSIS — O34.00 BICORNUATE UTERUS AFFECTING PREGNANCY, ANTEPARTUM: ICD-10-CM

## 2024-01-31 DIAGNOSIS — Q51.3 BICORNUATE UTERUS AFFECTING PREGNANCY, ANTEPARTUM: ICD-10-CM

## 2024-01-31 DIAGNOSIS — Z72.0 TOBACCO ABUSE: ICD-10-CM

## 2024-01-31 DIAGNOSIS — O09.299 HX OF PREECLAMPSIA, PRIOR PREGNANCY, CURRENTLY PREGNANT: ICD-10-CM

## 2024-01-31 LAB
CREAT UR-MCNC: 267 MG/DL
GLUCOSE UR STRIP-MCNC: NEGATIVE MG/DL
PROT ?TM UR-MCNC: 40.2 MG/DL
PROT UR STRIP-MCNC: ABNORMAL MG/DL
PROT/CREAT UR: 0.15 MG/G{CREAT}

## 2024-01-31 PROCEDURE — 84156 ASSAY OF PROTEIN URINE: CPT | Performed by: OBSTETRICS & GYNECOLOGY

## 2024-01-31 PROCEDURE — 82570 ASSAY OF URINE CREATININE: CPT | Performed by: OBSTETRICS & GYNECOLOGY

## 2024-02-09 DIAGNOSIS — R11.2 NAUSEA AND VOMITING, UNSPECIFIED VOMITING TYPE: ICD-10-CM

## 2024-02-09 RX ORDER — ONDANSETRON 8 MG/1
TABLET, ORALLY DISINTEGRATING ORAL
Qty: 30 TABLET | Refills: 1 | Status: SHIPPED | OUTPATIENT
Start: 2024-02-09

## 2024-02-15 ENCOUNTER — ROUTINE PRENATAL (OUTPATIENT)
Dept: OBSTETRICS AND GYNECOLOGY | Facility: CLINIC | Age: 29
End: 2024-02-15
Payer: COMMERCIAL

## 2024-02-15 VITALS — SYSTOLIC BLOOD PRESSURE: 121 MMHG | WEIGHT: 174 LBS | BODY MASS INDEX: 30.82 KG/M2 | DIASTOLIC BLOOD PRESSURE: 78 MMHG

## 2024-02-15 DIAGNOSIS — Z34.80 SUPERVISION OF OTHER NORMAL PREGNANCY, ANTEPARTUM: Primary | ICD-10-CM

## 2024-02-15 LAB
GLUCOSE UR STRIP-MCNC: NEGATIVE MG/DL
PROT UR STRIP-MCNC: NEGATIVE MG/DL

## 2024-02-15 PROCEDURE — 87653 STREP B DNA AMP PROBE: CPT | Performed by: OBSTETRICS & GYNECOLOGY

## 2024-02-16 LAB — GROUP B STREP, DNA: NEGATIVE

## 2024-02-20 ENCOUNTER — TELEPHONE (OUTPATIENT)
Dept: OBSTETRICS AND GYNECOLOGY | Facility: CLINIC | Age: 29
End: 2024-02-20
Payer: COMMERCIAL

## 2024-02-20 NOTE — PROGRESS NOTES
Routine Prenatal Visit     Subjective  Maylin Abraham is a 28 y.o.  at 37w5d here for her routine OB visit.   She is taking her prenatal vitamins.Reports no loss of fluid or vaginal bleeding. Patient doing well without any complaints. Pregnancy complicated by:     Patient Active Problem List   Diagnosis    Supervision of other normal pregnancy, antepartum    Hx of preeclampsia, prior pregnancy, currently pregnant    Tobacco abuse    Bicornuate uterus affecting pregnancy, antepartum    Anemia of mother during pregnancy, delivered         OB History    Para Term  AB Living   3 1 1   1 1   SAB IAB Ectopic Molar Multiple Live Births   1         1      # Outcome Date GA Lbr Jeremiah/2nd Weight Sex Delivery Anes PTL Lv   3 Current            2 SAB 23     SAB      1 Term 20 38w0d  2722 g (6 lb) M Vag-Spont   NOLAN           ROS:   General ROS: negative for - chills or fatigue  Respiratory ROS: negative for - cough or hemoptysis  Cardiovascular ROS: negative for - chest pain or dyspnea on exertion  Genito-Urinary ROS: negative for  change in urinary stream, vaginal discharge   Musculoskeletal ROS: negative for - gait disturbance or joint pain  Dermatological ROS: negative for acne,  dry skin or itching    Objective  Physical Exam:   Vitals:    24 1021   BP: 124/78       Uterine Size: size equals dates  FHT: 110-160 BPM    General appearance - alert, well appearing, and in no distress  Mental status - alert, oriented to person, place, and time  Abdomen- Soft, Gravid uterus, non-tender to palpation  Musculoskeletal: negative for - gait disturbance or joint pain  Extremeties: negative swelling or cyanosis   Dermatological: negative rashes or skin lesions   1/30%/-3/mid/soft    Assessment/Plan:   Diagnoses and all orders for this visit:    1. Supervision of other normal pregnancy, antepartum (Primary)  -     POC Urinalysis Dipstick    2. Hx of preeclampsia, prior pregnancy, currently  pregnant    3. Bicornuate uterus affecting pregnancy, antepartum    4. Anemia of mother during pregnancy, delivered    5. Tobacco abuse            Counseling:   OB precautions, leaking, VB, papo urias vs PTL/Labor  [facility]  HTN precautions reviewed: HA, vision change, RUQ/epigastric pain, edema  IOL scheduled  IOL reviewed in detail.  R/B/A/SE/E.  All history reviewed and updated.  Pre-IOL exam performed.  Length can be 24-48+hrs.  PLAN: Cytotec and cooks balloon.  All questions answered.  She desires to proceed as planned.  She understands during early am the OB Hospitalist physicians will manage her labor and deliver prn any emergencies.    Round Ligament Pain:  The uterus has several ligaments which provide support and keep the uterus in place. As the  uterus grows these ligaments are pulled and stretched which often causes sharp stabbing like pain in the inguinal area.   You may find a pregnancy support band helpful. Changing positions may also help. Yoga is a great way to cope with round ligament and low back pain in pregnancy.    Massage may also help with low back pain   Things to Consider at this Point in your Pregnancy:  Some women experience swelling in their feet during pregnancy. Compression stockings may help  Drink plenty of water and stay active   Make sure you are eating frequent small meals, nuts are a wonderful snack to keep with you            Return in about 6 weeks (around 4/3/2024) for Postpartum visit.      We have gone over prenatal care to include the timing and content of visits. I informed her how to contact the office and/or on call person in the event of any problems and encouraged her to do so when she feels it is necessary.  We then spent time answering her questions which she indicated were answered to her satisfaction.    Megan Rivero DO  2/21/2024 10:47 EST

## 2024-02-21 ENCOUNTER — ROUTINE PRENATAL (OUTPATIENT)
Dept: OBSTETRICS AND GYNECOLOGY | Facility: CLINIC | Age: 29
End: 2024-02-21
Payer: COMMERCIAL

## 2024-02-21 VITALS — DIASTOLIC BLOOD PRESSURE: 78 MMHG | BODY MASS INDEX: 31.04 KG/M2 | WEIGHT: 175.2 LBS | SYSTOLIC BLOOD PRESSURE: 124 MMHG

## 2024-02-21 DIAGNOSIS — O09.299 HX OF PREECLAMPSIA, PRIOR PREGNANCY, CURRENTLY PREGNANT: ICD-10-CM

## 2024-02-21 DIAGNOSIS — Z72.0 TOBACCO ABUSE: ICD-10-CM

## 2024-02-21 DIAGNOSIS — O34.00 BICORNUATE UTERUS AFFECTING PREGNANCY, ANTEPARTUM: ICD-10-CM

## 2024-02-21 DIAGNOSIS — Q51.3 BICORNUATE UTERUS AFFECTING PREGNANCY, ANTEPARTUM: ICD-10-CM

## 2024-02-21 DIAGNOSIS — Z34.80 SUPERVISION OF OTHER NORMAL PREGNANCY, ANTEPARTUM: Primary | ICD-10-CM

## 2024-02-21 LAB
GLUCOSE UR STRIP-MCNC: NEGATIVE MG/DL
PROT UR STRIP-MCNC: ABNORMAL MG/DL

## 2024-02-28 ENCOUNTER — PREP FOR SURGERY (OUTPATIENT)
Dept: OTHER | Facility: HOSPITAL | Age: 29
End: 2024-02-28
Payer: COMMERCIAL

## 2024-02-28 DIAGNOSIS — Z34.80 SUPERVISION OF OTHER NORMAL PREGNANCY, ANTEPARTUM: Primary | ICD-10-CM

## 2024-02-28 RX ORDER — ONDANSETRON 4 MG/1
4 TABLET, ORALLY DISINTEGRATING ORAL EVERY 6 HOURS PRN
Status: CANCELLED | OUTPATIENT
Start: 2024-02-28

## 2024-02-28 RX ORDER — CITRIC ACID/SODIUM CITRATE 334-500MG
30 SOLUTION, ORAL ORAL ONCE AS NEEDED
Status: CANCELLED | OUTPATIENT
Start: 2024-02-28

## 2024-02-28 RX ORDER — OXYTOCIN/0.9 % SODIUM CHLORIDE 30/500 ML
2 PLASTIC BAG, INJECTION (ML) INTRAVENOUS
Status: CANCELLED | OUTPATIENT
Start: 2024-02-28

## 2024-02-28 RX ORDER — ONDANSETRON 2 MG/ML
4 INJECTION INTRAMUSCULAR; INTRAVENOUS EVERY 6 HOURS PRN
Status: CANCELLED | OUTPATIENT
Start: 2024-02-28

## 2024-02-28 RX ORDER — METOCLOPRAMIDE HYDROCHLORIDE 5 MG/ML
10 INJECTION INTRAMUSCULAR; INTRAVENOUS ONCE AS NEEDED
Status: CANCELLED | OUTPATIENT
Start: 2024-02-28

## 2024-02-28 RX ORDER — ACETAMINOPHEN 325 MG/1
650 TABLET ORAL ONCE AS NEEDED
Status: CANCELLED | OUTPATIENT
Start: 2024-02-28

## 2024-02-28 RX ORDER — FAMOTIDINE 20 MG/1
20 TABLET, FILM COATED ORAL ONCE AS NEEDED
Status: CANCELLED | OUTPATIENT
Start: 2024-02-28

## 2024-02-28 RX ORDER — ACETAMINOPHEN 325 MG/1
650 TABLET ORAL EVERY 4 HOURS PRN
Status: CANCELLED | OUTPATIENT
Start: 2024-02-28

## 2024-02-28 RX ORDER — LIDOCAINE HYDROCHLORIDE 10 MG/ML
0.5 INJECTION, SOLUTION INFILTRATION; PERINEURAL ONCE AS NEEDED
Status: CANCELLED | OUTPATIENT
Start: 2024-02-28

## 2024-02-28 RX ORDER — SODIUM CHLORIDE 9 MG/ML
40 INJECTION, SOLUTION INTRAVENOUS AS NEEDED
Status: CANCELLED | OUTPATIENT
Start: 2024-02-28

## 2024-02-28 RX ORDER — MISOPROSTOL 200 UG/1
800 TABLET ORAL AS NEEDED
Status: CANCELLED | OUTPATIENT
Start: 2024-02-28

## 2024-02-28 RX ORDER — FAMOTIDINE 10 MG/ML
20 INJECTION, SOLUTION INTRAVENOUS ONCE AS NEEDED
Status: CANCELLED | OUTPATIENT
Start: 2024-02-28

## 2024-02-28 RX ORDER — PROMETHAZINE HYDROCHLORIDE 12.5 MG/1
12.5 TABLET ORAL EVERY 6 HOURS PRN
Status: CANCELLED | OUTPATIENT
Start: 2024-02-28

## 2024-02-28 RX ORDER — OXYTOCIN/0.9 % SODIUM CHLORIDE 30/500 ML
999 PLASTIC BAG, INJECTION (ML) INTRAVENOUS ONCE
Status: CANCELLED | OUTPATIENT
Start: 2024-02-28 | End: 2024-02-28

## 2024-02-28 RX ORDER — SODIUM CHLORIDE 0.9 % (FLUSH) 0.9 %
10 SYRINGE (ML) INJECTION EVERY 12 HOURS SCHEDULED
Status: CANCELLED | OUTPATIENT
Start: 2024-02-28

## 2024-02-28 RX ORDER — MAGNESIUM CARB/ALUMINUM HYDROX 105-160MG
30 TABLET,CHEWABLE ORAL ONCE
Status: CANCELLED | OUTPATIENT
Start: 2024-02-28 | End: 2024-02-28

## 2024-02-28 RX ORDER — PROMETHAZINE HYDROCHLORIDE 25 MG/1
25 TABLET ORAL EVERY 6 HOURS PRN
Status: CANCELLED | OUTPATIENT
Start: 2024-02-28

## 2024-02-28 RX ORDER — HYDROCODONE BITARTRATE AND ACETAMINOPHEN 10; 325 MG/1; MG/1
1 TABLET ORAL EVERY 4 HOURS PRN
Status: CANCELLED | OUTPATIENT
Start: 2024-02-28 | End: 2024-03-06

## 2024-02-28 RX ORDER — OXYTOCIN/0.9 % SODIUM CHLORIDE 30/500 ML
250 PLASTIC BAG, INJECTION (ML) INTRAVENOUS CONTINUOUS
Status: CANCELLED | OUTPATIENT
Start: 2024-02-28 | End: 2024-02-28

## 2024-02-28 RX ORDER — FAMOTIDINE 20 MG/1
20 TABLET, FILM COATED ORAL 2 TIMES DAILY PRN
Status: CANCELLED | OUTPATIENT
Start: 2024-02-28

## 2024-02-28 RX ORDER — SODIUM CHLORIDE 0.9 % (FLUSH) 0.9 %
10 SYRINGE (ML) INJECTION AS NEEDED
Status: CANCELLED | OUTPATIENT
Start: 2024-02-28

## 2024-02-28 RX ORDER — IBUPROFEN 800 MG/1
800 TABLET ORAL ONCE AS NEEDED
Status: CANCELLED | OUTPATIENT
Start: 2024-02-28

## 2024-02-28 RX ORDER — SODIUM CHLORIDE, SODIUM LACTATE, POTASSIUM CHLORIDE, CALCIUM CHLORIDE 600; 310; 30; 20 MG/100ML; MG/100ML; MG/100ML; MG/100ML
125 INJECTION, SOLUTION INTRAVENOUS CONTINUOUS
Status: CANCELLED | OUTPATIENT
Start: 2024-02-28

## 2024-02-28 RX ORDER — HYDROCODONE BITARTRATE AND ACETAMINOPHEN 5; 325 MG/1; MG/1
1 TABLET ORAL EVERY 4 HOURS PRN
Status: CANCELLED | OUTPATIENT
Start: 2024-02-28 | End: 2024-03-06

## 2024-02-28 RX ORDER — TERBUTALINE SULFATE 1 MG/ML
0.25 INJECTION, SOLUTION SUBCUTANEOUS AS NEEDED
Status: CANCELLED | OUTPATIENT
Start: 2024-02-28

## 2024-02-28 RX ORDER — FAMOTIDINE 10 MG/ML
20 INJECTION, SOLUTION INTRAVENOUS 2 TIMES DAILY PRN
Status: CANCELLED | OUTPATIENT
Start: 2024-02-28

## 2024-02-28 RX ORDER — METHYLERGONOVINE MALEATE 0.2 MG/ML
200 INJECTION INTRAVENOUS ONCE AS NEEDED
Status: CANCELLED | OUTPATIENT
Start: 2024-02-28

## 2024-02-29 ENCOUNTER — HOSPITAL ENCOUNTER (INPATIENT)
Dept: LABOR AND DELIVERY | Facility: HOSPITAL | Age: 29
Discharge: HOME | End: 2024-02-29
Payer: COMMERCIAL

## 2024-02-29 ENCOUNTER — ANESTHESIA (OUTPATIENT)
Dept: LABOR AND DELIVERY | Facility: HOSPITAL | Age: 29
End: 2024-02-29
Payer: COMMERCIAL

## 2024-02-29 ENCOUNTER — ANESTHESIA EVENT (OUTPATIENT)
Dept: LABOR AND DELIVERY | Facility: HOSPITAL | Age: 29
End: 2024-02-29
Payer: COMMERCIAL

## 2024-02-29 ENCOUNTER — HOSPITAL ENCOUNTER (INPATIENT)
Facility: HOSPITAL | Age: 29
LOS: 2 days | Discharge: HOME | End: 2024-03-02
Attending: OBSTETRICS & GYNECOLOGY | Admitting: OBSTETRICS & GYNECOLOGY
Payer: COMMERCIAL

## 2024-02-29 DIAGNOSIS — Z34.80 SUPERVISION OF OTHER NORMAL PREGNANCY, ANTEPARTUM: ICD-10-CM

## 2024-02-29 PROBLEM — Z34.90 ENCOUNTER FOR INDUCTION OF LABOR: Status: ACTIVE | Noted: 2024-02-29

## 2024-02-29 LAB
ABO GROUP BLD: NORMAL
AMPHET+METHAMPHET UR QL: NEGATIVE
BARBITURATES UR QL SCN: NEGATIVE
BENZODIAZ UR QL SCN: NEGATIVE
BLD GP AB SCN SERPL QL: NEGATIVE
CANNABINOIDS SERPL QL: NEGATIVE
COCAINE UR QL: NEGATIVE
DEPRECATED RDW RBC AUTO: 40.6 FL (ref 37–54)
ERYTHROCYTE [DISTWIDTH] IN BLOOD BY AUTOMATED COUNT: 13.8 % (ref 12.3–15.4)
FENTANYL UR-MCNC: NEGATIVE NG/ML
HCT VFR BLD AUTO: 31.6 % (ref 34–46.6)
HGB BLD-MCNC: 10.1 G/DL (ref 12–15.9)
MCH RBC QN AUTO: 25.9 PG (ref 26.6–33)
MCHC RBC AUTO-ENTMCNC: 32 G/DL (ref 31.5–35.7)
MCV RBC AUTO: 81 FL (ref 79–97)
METHADONE UR QL SCN: NEGATIVE
OPIATES UR QL: NEGATIVE
OXYCODONE UR QL SCN: NEGATIVE
PLATELET # BLD AUTO: 239 10*3/MM3 (ref 140–450)
PMV BLD AUTO: 13.9 FL (ref 6–12)
RBC # BLD AUTO: 3.9 10*6/MM3 (ref 3.77–5.28)
RH BLD: POSITIVE
T PALLIDUM IGG SER QL: NORMAL
T&S EXPIRATION DATE: NORMAL
WBC NRBC COR # BLD AUTO: 16.21 10*3/MM3 (ref 3.4–10.8)

## 2024-02-29 PROCEDURE — 25010000002 ONDANSETRON PER 1 MG: Performed by: OBSTETRICS & GYNECOLOGY

## 2024-02-29 PROCEDURE — 80307 DRUG TEST PRSMV CHEM ANLYZR: CPT | Performed by: OBSTETRICS & GYNECOLOGY

## 2024-02-29 PROCEDURE — 59410 OBSTETRICAL CARE: CPT | Performed by: OBSTETRICS & GYNECOLOGY

## 2024-02-29 PROCEDURE — 85027 COMPLETE CBC AUTOMATED: CPT | Performed by: OBSTETRICS & GYNECOLOGY

## 2024-02-29 PROCEDURE — 25810000003 LACTATED RINGERS SOLUTION: Performed by: OBSTETRICS & GYNECOLOGY

## 2024-02-29 PROCEDURE — 25010000002 ROPIVACAINE PER 1 MG: Performed by: NURSE ANESTHETIST, CERTIFIED REGISTERED

## 2024-02-29 PROCEDURE — 25010000002 MORPHINE SULFATE (PF) 5 MG/ML SOLUTION: Performed by: OBSTETRICS & GYNECOLOGY

## 2024-02-29 PROCEDURE — C1755 CATHETER, INTRASPINAL: HCPCS | Performed by: NURSE ANESTHETIST, CERTIFIED REGISTERED

## 2024-02-29 PROCEDURE — 51702 INSERT TEMP BLADDER CATH: CPT

## 2024-02-29 PROCEDURE — 86900 BLOOD TYPING SEROLOGIC ABO: CPT | Performed by: OBSTETRICS & GYNECOLOGY

## 2024-02-29 PROCEDURE — 86780 TREPONEMA PALLIDUM: CPT | Performed by: OBSTETRICS & GYNECOLOGY

## 2024-02-29 PROCEDURE — 86901 BLOOD TYPING SEROLOGIC RH(D): CPT | Performed by: OBSTETRICS & GYNECOLOGY

## 2024-02-29 PROCEDURE — 86850 RBC ANTIBODY SCREEN: CPT | Performed by: OBSTETRICS & GYNECOLOGY

## 2024-02-29 PROCEDURE — 25810000003 LACTATED RINGERS PER 1000 ML: Performed by: OBSTETRICS & GYNECOLOGY

## 2024-02-29 PROCEDURE — 3E033VJ INTRODUCTION OF OTHER HORMONE INTO PERIPHERAL VEIN, PERCUTANEOUS APPROACH: ICD-10-PCS | Performed by: OBSTETRICS & GYNECOLOGY

## 2024-02-29 PROCEDURE — 25010000002 FENTANYL CITRATE (PF) 50 MCG/ML SOLUTION: Performed by: NURSE ANESTHETIST, CERTIFIED REGISTERED

## 2024-02-29 RX ORDER — LIDOCAINE HCL/EPINEPHRINE/PF 2%-1:200K
VIAL (ML) INJECTION
Status: COMPLETED
Start: 2024-02-29 | End: 2024-02-29

## 2024-02-29 RX ORDER — ACETAMINOPHEN 325 MG/1
650 TABLET ORAL EVERY 6 HOURS PRN
Status: DISCONTINUED | OUTPATIENT
Start: 2024-02-29 | End: 2024-03-02 | Stop reason: HOSPADM

## 2024-02-29 RX ORDER — EPHEDRINE SULFATE 50 MG/ML
5 INJECTION, SOLUTION INTRAVENOUS
Status: DISCONTINUED | OUTPATIENT
Start: 2024-02-29 | End: 2024-02-29

## 2024-02-29 RX ORDER — OXYTOCIN/0.9 % SODIUM CHLORIDE 30/500 ML
2 PLASTIC BAG, INJECTION (ML) INTRAVENOUS
Status: DISCONTINUED | OUTPATIENT
Start: 2024-02-29 | End: 2024-02-29

## 2024-02-29 RX ORDER — ONDANSETRON 4 MG/1
4 TABLET, ORALLY DISINTEGRATING ORAL EVERY 8 HOURS PRN
Status: DISCONTINUED | OUTPATIENT
Start: 2024-02-29 | End: 2024-03-02 | Stop reason: HOSPADM

## 2024-02-29 RX ORDER — OXYTOCIN/0.9 % SODIUM CHLORIDE 30/500 ML
999 PLASTIC BAG, INJECTION (ML) INTRAVENOUS ONCE
Status: COMPLETED | OUTPATIENT
Start: 2024-02-29 | End: 2024-02-29

## 2024-02-29 RX ORDER — MORPHINE SULFATE 5 MG/ML
5 INJECTION, SOLUTION INTRAMUSCULAR; INTRAVENOUS
Status: DISCONTINUED | OUTPATIENT
Start: 2024-02-29 | End: 2024-02-29

## 2024-02-29 RX ORDER — IBUPROFEN 600 MG/1
600 TABLET ORAL EVERY 6 HOURS SCHEDULED
Status: DISCONTINUED | OUTPATIENT
Start: 2024-03-01 | End: 2024-03-02 | Stop reason: HOSPADM

## 2024-02-29 RX ORDER — CALCIUM CARBONATE 500 MG/1
2 TABLET, CHEWABLE ORAL 3 TIMES DAILY PRN
Status: DISCONTINUED | OUTPATIENT
Start: 2024-02-29 | End: 2024-03-02 | Stop reason: HOSPADM

## 2024-02-29 RX ORDER — SODIUM CHLORIDE 0.9 % (FLUSH) 0.9 %
1-10 SYRINGE (ML) INJECTION AS NEEDED
Status: DISCONTINUED | OUTPATIENT
Start: 2024-02-29 | End: 2024-03-02 | Stop reason: HOSPADM

## 2024-02-29 RX ORDER — FENTANYL CITRATE 50 UG/ML
INJECTION, SOLUTION INTRAMUSCULAR; INTRAVENOUS
Status: COMPLETED | OUTPATIENT
Start: 2024-02-29 | End: 2024-02-29

## 2024-02-29 RX ORDER — FAMOTIDINE 20 MG/1
20 TABLET, FILM COATED ORAL 2 TIMES DAILY PRN
Status: DISCONTINUED | OUTPATIENT
Start: 2024-02-29 | End: 2024-02-29

## 2024-02-29 RX ORDER — SODIUM CHLORIDE, SODIUM LACTATE, POTASSIUM CHLORIDE, CALCIUM CHLORIDE 600; 310; 30; 20 MG/100ML; MG/100ML; MG/100ML; MG/100ML
125 INJECTION, SOLUTION INTRAVENOUS CONTINUOUS
Status: DISCONTINUED | OUTPATIENT
Start: 2024-02-29 | End: 2024-02-29

## 2024-02-29 RX ORDER — LIDOCAINE HCL/EPINEPHRINE/PF 2%-1:200K
VIAL (ML) INJECTION AS NEEDED
Status: DISCONTINUED | OUTPATIENT
Start: 2024-02-29 | End: 2024-02-29 | Stop reason: SURG

## 2024-02-29 RX ORDER — ONDANSETRON 2 MG/ML
4 INJECTION INTRAMUSCULAR; INTRAVENOUS EVERY 6 HOURS PRN
Status: DISCONTINUED | OUTPATIENT
Start: 2024-02-29 | End: 2024-02-29

## 2024-02-29 RX ORDER — FAMOTIDINE 10 MG/ML
20 INJECTION, SOLUTION INTRAVENOUS 2 TIMES DAILY PRN
Status: DISCONTINUED | OUTPATIENT
Start: 2024-02-29 | End: 2024-02-29

## 2024-02-29 RX ORDER — METOCLOPRAMIDE HYDROCHLORIDE 5 MG/ML
10 INJECTION INTRAMUSCULAR; INTRAVENOUS ONCE AS NEEDED
Status: DISCONTINUED | OUTPATIENT
Start: 2024-02-29 | End: 2024-02-29

## 2024-02-29 RX ORDER — OXYTOCIN/0.9 % SODIUM CHLORIDE 30/500 ML
125 PLASTIC BAG, INJECTION (ML) INTRAVENOUS ONCE AS NEEDED
Status: DISCONTINUED | OUTPATIENT
Start: 2024-02-29 | End: 2024-03-02 | Stop reason: HOSPADM

## 2024-02-29 RX ORDER — OXYTOCIN/0.9 % SODIUM CHLORIDE 30/500 ML
250 PLASTIC BAG, INJECTION (ML) INTRAVENOUS CONTINUOUS
Status: ACTIVE | OUTPATIENT
Start: 2024-02-29 | End: 2024-02-29

## 2024-02-29 RX ORDER — METHYLERGONOVINE MALEATE 0.2 MG/ML
200 INJECTION INTRAVENOUS ONCE AS NEEDED
Status: DISCONTINUED | OUTPATIENT
Start: 2024-02-29 | End: 2024-02-29

## 2024-02-29 RX ORDER — PROMETHAZINE HYDROCHLORIDE 12.5 MG/1
12.5 TABLET ORAL EVERY 6 HOURS PRN
Status: DISCONTINUED | OUTPATIENT
Start: 2024-02-29 | End: 2024-02-29

## 2024-02-29 RX ORDER — LIDOCAINE HYDROCHLORIDE 10 MG/ML
0.5 INJECTION, SOLUTION INFILTRATION; PERINEURAL ONCE AS NEEDED
Status: DISCONTINUED | OUTPATIENT
Start: 2024-02-29 | End: 2024-02-29

## 2024-02-29 RX ORDER — SODIUM CHLORIDE 0.9 % (FLUSH) 0.9 %
10 SYRINGE (ML) INJECTION AS NEEDED
Status: DISCONTINUED | OUTPATIENT
Start: 2024-02-29 | End: 2024-02-29

## 2024-02-29 RX ORDER — ROPIVACAINE HYDROCHLORIDE 2 MG/ML
INJECTION, SOLUTION EPIDURAL; INFILTRATION; PERINEURAL
Status: COMPLETED | OUTPATIENT
Start: 2024-02-29 | End: 2024-02-29

## 2024-02-29 RX ORDER — ONDANSETRON 4 MG/1
4 TABLET, ORALLY DISINTEGRATING ORAL EVERY 6 HOURS PRN
Status: DISCONTINUED | OUTPATIENT
Start: 2024-02-29 | End: 2024-02-29

## 2024-02-29 RX ORDER — CITRIC ACID/SODIUM CITRATE 334-500MG
30 SOLUTION, ORAL ORAL ONCE AS NEEDED
Status: DISCONTINUED | OUTPATIENT
Start: 2024-02-29 | End: 2024-02-29

## 2024-02-29 RX ORDER — SODIUM CHLORIDE 0.9 % (FLUSH) 0.9 %
10 SYRINGE (ML) INJECTION EVERY 12 HOURS SCHEDULED
Status: DISCONTINUED | OUTPATIENT
Start: 2024-02-29 | End: 2024-02-29

## 2024-02-29 RX ORDER — HYDROCODONE BITARTRATE AND ACETAMINOPHEN 5; 325 MG/1; MG/1
1 TABLET ORAL EVERY 4 HOURS PRN
Status: DISCONTINUED | OUTPATIENT
Start: 2024-02-29 | End: 2024-02-29

## 2024-02-29 RX ORDER — PROMETHAZINE HYDROCHLORIDE 25 MG/1
25 TABLET ORAL EVERY 6 HOURS PRN
Status: DISCONTINUED | OUTPATIENT
Start: 2024-02-29 | End: 2024-02-29

## 2024-02-29 RX ORDER — TERBUTALINE SULFATE 1 MG/ML
0.25 INJECTION, SOLUTION SUBCUTANEOUS AS NEEDED
Status: DISCONTINUED | OUTPATIENT
Start: 2024-02-29 | End: 2024-02-29

## 2024-02-29 RX ORDER — MISOPROSTOL 200 UG/1
800 TABLET ORAL AS NEEDED
Status: DISCONTINUED | OUTPATIENT
Start: 2024-02-29 | End: 2024-02-29

## 2024-02-29 RX ORDER — SODIUM CHLORIDE 9 MG/ML
40 INJECTION, SOLUTION INTRAVENOUS AS NEEDED
Status: DISCONTINUED | OUTPATIENT
Start: 2024-02-29 | End: 2024-02-29

## 2024-02-29 RX ORDER — MAGNESIUM CARB/ALUMINUM HYDROX 105-160MG
30 TABLET,CHEWABLE ORAL ONCE
Status: DISCONTINUED | OUTPATIENT
Start: 2024-02-29 | End: 2024-02-29

## 2024-02-29 RX ORDER — IBUPROFEN 800 MG/1
800 TABLET ORAL ONCE AS NEEDED
Status: DISCONTINUED | OUTPATIENT
Start: 2024-02-29 | End: 2024-02-29

## 2024-02-29 RX ORDER — PROMETHAZINE HYDROCHLORIDE 12.5 MG/1
12.5 TABLET ORAL EVERY 4 HOURS PRN
Status: DISCONTINUED | OUTPATIENT
Start: 2024-02-29 | End: 2024-03-02 | Stop reason: HOSPADM

## 2024-02-29 RX ORDER — ACETAMINOPHEN 325 MG/1
650 TABLET ORAL EVERY 4 HOURS PRN
Status: DISCONTINUED | OUTPATIENT
Start: 2024-02-29 | End: 2024-02-29

## 2024-02-29 RX ORDER — FENTANYL CITRATE 50 UG/ML
INJECTION, SOLUTION INTRAMUSCULAR; INTRAVENOUS
Status: COMPLETED
Start: 2024-02-29 | End: 2024-02-29

## 2024-02-29 RX ORDER — FAMOTIDINE 20 MG/1
20 TABLET, FILM COATED ORAL ONCE AS NEEDED
Status: DISCONTINUED | OUTPATIENT
Start: 2024-02-29 | End: 2024-02-29

## 2024-02-29 RX ORDER — DOCUSATE SODIUM 100 MG/1
100 CAPSULE, LIQUID FILLED ORAL DAILY
Status: DISCONTINUED | OUTPATIENT
Start: 2024-03-01 | End: 2024-03-02 | Stop reason: HOSPADM

## 2024-02-29 RX ORDER — ACETAMINOPHEN 325 MG/1
650 TABLET ORAL ONCE AS NEEDED
Status: DISCONTINUED | OUTPATIENT
Start: 2024-02-29 | End: 2024-02-29

## 2024-02-29 RX ORDER — FAMOTIDINE 10 MG/ML
20 INJECTION, SOLUTION INTRAVENOUS ONCE AS NEEDED
Status: DISCONTINUED | OUTPATIENT
Start: 2024-02-29 | End: 2024-02-29

## 2024-02-29 RX ORDER — PRENATAL VIT/IRON FUM/FOLIC AC 27MG-0.8MG
1 TABLET ORAL DAILY
Status: DISCONTINUED | OUTPATIENT
Start: 2024-03-01 | End: 2024-03-02 | Stop reason: HOSPADM

## 2024-02-29 RX ORDER — HYDROCODONE BITARTRATE AND ACETAMINOPHEN 10; 325 MG/1; MG/1
1 TABLET ORAL EVERY 4 HOURS PRN
Status: DISCONTINUED | OUTPATIENT
Start: 2024-02-29 | End: 2024-02-29

## 2024-02-29 RX ORDER — BISACODYL 10 MG
10 SUPPOSITORY, RECTAL RECTAL DAILY PRN
Status: DISCONTINUED | OUTPATIENT
Start: 2024-03-01 | End: 2024-03-02 | Stop reason: HOSPADM

## 2024-02-29 RX ADMIN — WITCH HAZEL: 500 SOLUTION RECTAL; TOPICAL at 20:14

## 2024-02-29 RX ADMIN — SODIUM CHLORIDE, POTASSIUM CHLORIDE, SODIUM LACTATE AND CALCIUM CHLORIDE 1000 ML: 600; 310; 30; 20 INJECTION, SOLUTION INTRAVENOUS at 08:02

## 2024-02-29 RX ADMIN — Medication: at 20:14

## 2024-02-29 RX ADMIN — Medication 999 ML/HR: at 17:10

## 2024-02-29 RX ADMIN — Medication 250 ML/HR: at 17:35

## 2024-02-29 RX ADMIN — MORPHINE SULFATE 5 MG: 5 INJECTION, SOLUTION INTRAMUSCULAR; INTRAVENOUS at 13:23

## 2024-02-29 RX ADMIN — LIDOCAINE HYDROCHLORIDE,EPINEPHRINE BITARTRATE 3 ML: 20; .005 INJECTION, SOLUTION EPIDURAL; INFILTRATION; INTRACAUDAL; PERINEURAL at 15:17

## 2024-02-29 RX ADMIN — ROPIVACAINE HYDROCHLORIDE 8 ML: 2 INJECTION, SOLUTION EPIDURAL; INFILTRATION; PERINEURAL at 14:57

## 2024-02-29 RX ADMIN — Medication 10 ML/HR: at 14:59

## 2024-02-29 RX ADMIN — ONDANSETRON 4 MG: 2 INJECTION INTRAMUSCULAR; INTRAVENOUS at 16:08

## 2024-02-29 RX ADMIN — FENTANYL CITRATE 100 MCG: 50 INJECTION, SOLUTION INTRAMUSCULAR; INTRAVENOUS at 14:57

## 2024-02-29 RX ADMIN — SODIUM CHLORIDE, POTASSIUM CHLORIDE, SODIUM LACTATE AND CALCIUM CHLORIDE 125 ML/HR: 600; 310; 30; 20 INJECTION, SOLUTION INTRAVENOUS at 09:54

## 2024-02-29 RX ADMIN — Medication 2 MILLI-UNITS/MIN: at 08:21

## 2024-02-29 RX ADMIN — IBUPROFEN 600 MG: 600 TABLET, FILM COATED ORAL at 23:49

## 2024-02-29 RX ADMIN — SODIUM CHLORIDE, POTASSIUM CHLORIDE, SODIUM LACTATE AND CALCIUM CHLORIDE 125 ML/HR: 600; 310; 30; 20 INJECTION, SOLUTION INTRAVENOUS at 15:02

## 2024-02-29 RX ADMIN — SODIUM CHLORIDE, POTASSIUM CHLORIDE, SODIUM LACTATE AND CALCIUM CHLORIDE 125 ML/HR: 600; 310; 30; 20 INJECTION, SOLUTION INTRAVENOUS at 16:11

## 2024-02-29 RX ADMIN — LIDOCAINE HYDROCHLORIDE,EPINEPHRINE BITARTRATE 3 ML: 20; .005 INJECTION, SOLUTION EPIDURAL; INFILTRATION; INTRACAUDAL; PERINEURAL at 15:14

## 2024-02-29 RX ADMIN — EPHEDRINE SULFATE 5 MG: 50 INJECTION INTRAVENOUS at 16:06

## 2024-02-29 NOTE — H&P
Obstetric History and Physical     Maylin Abraham is a 28 y.o.  at 39w0d weeks EGA.  She presents for elective IOL. Patient denies any vaginal bleeding, loss of fluid or decreased fetal movements.  Her ACOG is reviewed and current.    Past Medical History:   Diagnosis Date    Anemia     Gestational hypertension     Hypertension     Ovarian cyst        History reviewed. No pertinent surgical history.    Family History   Problem Relation Age of Onset    Breast cancer Neg Hx     Ovarian cancer Neg Hx     Colon cancer Neg Hx     Uterine cancer Neg Hx        Social History     Tobacco Use    Smoking status: Every Day     Packs/day: .5     Types: Cigarettes     Passive exposure: Current    Smokeless tobacco: Never   Vaping Use    Vaping Use: Never used   Substance Use Topics    Alcohol use: Not Currently    Drug use: Not Currently     Types: Marijuana       Prior to Admission Meds: (Last known outpatient meds at time of note signature)  Prior to Admission medications    Medication Sig Start Date End Date Taking? Authorizing Provider   docusate sodium (Colace) 100 MG capsule Take 1 capsule by mouth 2 (Two) Times a Day As Needed for Constipation. 8/10/23   Megan Rivero DO   ondansetron ODT (ZOFRAN-ODT) 8 MG disintegrating tablet dissolve 1 tablet under the tongue EVERY 8 HOURS AS NEEDED FOR NAUSEA AND VOMITING 24   Megan Rivero DO   Prenatal Vit-Fe Fumarate-FA (Prenatal 27-1) 27-1 MG tablet tablet Take 1 tablet by mouth Daily. 7/10/23   Megan Rivero DO         ROS:    General ROS: negative for - chills or fatigue  Ophthalmic ROS: negative for - blurry vision or decreased vision  ENT ROS: negative for - epistaxis, headaches or hearing change  Allergy and Immunology ROS: negative for - hives or insect bite sensitivity  Hematological and Lymphatic ROS: negative for - bleeding problems or blood clots  Endocrine ROS: negative for - breast changes or galactorrhea  Breast ROS: negative for - galactorrhea or new  or changing breast lumps  Respiratory ROS: negative for - cough or hemoptysis  Cardiovascular ROS: negative for - chest pain or dyspnea on exertion  Gastrointestinal ROS: negative for - abdominal pain or appetite loss  Genito-Urinary ROS: negative for - change in urinary stream, dysuria   Musculoskeletal ROS: negative for - gait disturbance or joint pain  Neurological ROS: negative for - behavioral changes or bowel and bladder control changes  Dermatological ROS: negative for acne and dry skin      Vitals:    24 0910   BP:    Pulse: 89   Resp:    Temp:    SpO2: 100%       Physical Exam   General- NAD, alert and oriented, appropriate  Psych- Normal mood, good memory  CV- Regular rhythm, no murnurs  Resp- CTA to bases, no wheezes  Abdomen- NABS, soft, non distended, non tender, no masses  Abdomen- Gravid, non tender  Fundus-  Size: consistent w dates.  Non tender.  Cvx- 1cm / 70% / -2, Cooks balloon placed without difficulty 60/60  Presentation- VTX  Ext/DTRs- No edema    Fetal HR: Category I  Contractions: Irregular    Plans reviewed and discussed with Maylin who is in agreement.        ASSESSMENT:  39w0d elective IOL  Scheduled IOL  GBS: Negative      PLAN:  Admitted to L&D  Delivery: See labor orders, plan for     Plan of care NLT hospital course, R/B/A/potential SE, suspected length 24-48+hrs have been reviewed with patient and any family or friends present, questions answered to her/his/their satisfaction.  Pt desires to proceed as above.    Counseling:The patient was counseled on the risks, benefits and alternatives of Induction.  Risks reviewed, but are not limited to: bleeding, transfusion, fetal intolerance,  (possibly emergent),  and uterine rupture.  She declines expectant management or  and desires induction.  Reviewed risks w prematurity.  All her questions have been answered to her satisfaction and she desires to proceed.  Specifically with Cytotec, she understands that it is  endorsed by the [employer], but not FDA approved for the induction of labor.        Megan Rivero, DO

## 2024-02-29 NOTE — ANESTHESIA PROCEDURE NOTES
Labor Epidural    Pre-sedation assessment completed: 2/29/2024 2:45 PM    Patient reassessed immediately prior to procedure    Patient location during procedure: OB  Start Time: 2/29/2024 2:45 PM  Stop Time: 2/29/2024 3:19 PM  Performed By  CRNA/CAA: Brooke Santiago CRNA  Preanesthetic Checklist  Completed: patient identified, IV checked, site marked, risks and benefits discussed, surgical consent, monitors and equipment checked, pre-op evaluation and timeout performed  Prep:  Pt Position:sitting  Sterile Tech:sterile barrier, mask and gloves  Prep:povidone-iodine 7.5% surgical scrub  Monitoring:blood pressure monitoring and continuous pulse oximetry  Epidural Block Procedure:  Approach:midline  Guidance:landmark technique and palpation technique  Location:L4-L5  Needle Type:Ming  Needle Gauge:17 G  Loss of Resistance Medium: saline  Loss of Resistance: 5cm  Cath Depth at skin:10 cm  Paresthesia: none  Aspiration:negative  Test Dose:negative  Medication: ropivacaine (NAROPIN) 0.2 % injection - Injection   8 mL - 2/29/2024 2:57:00 PM  fentaNYL citrate (PF) (SUBLIMAZE) injection - Epidural   100 mcg - 2/29/2024 2:57:00 PM  Number of Attempts: 1  Post Assessment:  Dressing:occlusive dressing applied and secured with tape  Pt Tolerance:patient tolerated the procedure well with no apparent complications  Complications:no

## 2024-02-29 NOTE — L&D DELIVERY NOTE
LoidaanjumMatias [[id number]]      Labor Events     labor?: No   steroids?: None  Antibiotics during labor?: No  Sac identifier: Sac 1  Rupture date/time: 2024 1030  Rupture type: spontaneous rupture of membranes  Fluid color: normal, clear  Fluid odor: None  Induction: Balloon Dilation, Oxytocin  Induction indications: Elective  Complications: None       Labor Event Times    Labor onset date/time: 2024 0821  Dilation complete date/time: 2024  Start pushing date/time: 2024 165       Anesthesia    Method: Epidural       Operative Delivery    Forceps attempted?: No  Vacuum extractor attempted?: No       Shoulder Dystocia    Shoulder dystocia present: No                              Presentation    Presentation: Vertex  Position: Occiput Anterior        Delivery    Birth date/time: 2024 17:04:00  Delivery type: Vaginal, Spontaneous  Complications: None       Delivery Providers    Delivering clinician: Megan Rivero DO   Provider Role    Rafaela Patiño RN Circulator    Dilip Pantoja RN Baby Nurse     Infant Provider     Anesthesia Provider    Ashley Singleton OB Tech    Sandor oGlden RN Baby Nurse          Cord    Vessels: 3 vessels  Complications: None  Delayed cord clamping?: No  Cord clamped date/time: 2024  Gases sent?: No  Stem cell collection (by provider): No       Placenta    Placenta delivery date/time: 2024  Placenta removal: Manual removal  Placenta appearance: Intact  Placenta disposition: discarded       Resuscitation    Method: Suctioning, Tactile Stimulation, Dried        Apgars       Living status: Living      Apgars assigned by: DILIP BOB RN          Apgar Component Scores       Component 1 min. 5 min.    Skin color:  0  1     Heart rate:  2  2     Reflex irritability:  2  2     Muscle tone:  2  2     Respiratory effort:  2  2     Total:  8  9                Measurements    Weight: 3190 g  Weight (lbs): 7 lb 0.5  "oz  Length: 50.8 cm  Length (in): 20\"       Lacerations    Episiotomy: None  Perineal laceration: None  Other lacerations?: No  Vaginal delivery est. blood loss (mL): 100       Vaginal Counts       Initial count personnel: SH AND EM      Initial count verified by: EM           Final count personnel: SH AND EM      Final count verified by: EM      Accurate final count?: Yes               Procedures    Procedures: None                  VAGINAL DELIVERY NOTE          Date: 2024   Time:  5:04 PM   GA: 39w0d       Encounter for induction of labor        Infant: female  infant   3190 g (7 lb 0.5 oz)     APGARS: 8  @ 1 minute / 9  @ 5 minutes    Delivery:  Patient received epidural and progressed to complete. Patient delivered over intact perineum. Infant head delivered in YING position. No nuchal cord noted. Infant shoulder and body delivered without difficulty. Infant was placed onto mothers abdomen. Delayed cord clamping for 1 minute. Cord was then clamped and cut. Cord blood obtained and sent.  Placenta delivered spontaneously and was intact, pitocin was started. Vagina was cleared of all clots and inspected for any lacerations. No lacerations noted.  All sponges, needles and instruments were counted and correct.    EBL: 100 ml    Complications: None    Anesthesia:  Epidural    Episiotomy:  none    Lacerations:  none    See Delivery Summary for Apgar and Weight.    Megan Rivero DO  2024 17:21 EST          Electronically signed by Megan Rivero DO, 24, 5:21 PM EST.       [facility] LABOR AND DELIVERY  [address] [place] [address] [address]VE  [place] [address]  Dept: [phone]  Dept Fax: [phone]  Loc: [phone]     "

## 2024-02-29 NOTE — ANESTHESIA PREPROCEDURE EVALUATION
Anesthesia Evaluation     NPO Solid Status: N/A  NPO Liquid Status: N/A           Airway   Mallampati: II  TM distance: >3 FB  Neck ROM: full  No difficulty expected  Dental - normal exam     Pulmonary - normal exam    breath sounds clear to auscultation  (+) a smoker,  Cardiovascular - normal exam    (+) hypertension      Neuro/Psych- negative ROS  GI/Hepatic/Renal/Endo - negative ROS     Musculoskeletal     Abdominal  - normal exam   Substance History - negative use     OB/GYN    (+) Pregnant, pregnancy induced hypertension        Other          Other Comment: Anemia               Anesthesia Plan    ASA 2     epidural       Anesthetic plan, risks, benefits, and alternatives have been provided, discussed and informed consent has been obtained with: patient.    Plan discussed with CRNA.    CODE STATUS:    Code Status (Patient has no pulse and is not breathing): CPR (Attempt to Resuscitate)  Medical Interventions (Patient has pulse or is breathing): Full

## 2024-03-01 ENCOUNTER — PATIENT OUTREACH (OUTPATIENT)
Dept: LABOR AND DELIVERY | Facility: HOSPITAL | Age: 29
End: 2024-03-01
Payer: COMMERCIAL

## 2024-03-01 PROCEDURE — 63710000001 ONDANSETRON ODT 4 MG TABLET DISPERSIBLE: Performed by: OBSTETRICS & GYNECOLOGY

## 2024-03-01 RX ORDER — HYDROCODONE BITARTRATE AND ACETAMINOPHEN 5; 325 MG/1; MG/1
1 TABLET ORAL ONCE AS NEEDED
Status: COMPLETED | OUTPATIENT
Start: 2024-03-01 | End: 2024-03-01

## 2024-03-01 RX ADMIN — ACETAMINOPHEN 650 MG: 325 TABLET ORAL at 02:17

## 2024-03-01 RX ADMIN — VITAMIN A, VITAMIN C, VITAMIN D, VITAMIN E, THIAMINE, RIBOFLAVIN, NIACIN, VITAMIN B6, FOLIC ACID, VITAMIN B12, CALCIUM, IRON, ZINC, COPPER 1 TABLET: 4000; 120; 400; 22; 1.84; 3; 20; 10; 1; 12; 200; 27; 25; 2 TABLET ORAL at 08:27

## 2024-03-01 RX ADMIN — HYDROCODONE BITARTRATE AND ACETAMINOPHEN 1 TABLET: 5; 325 TABLET ORAL at 07:16

## 2024-03-01 RX ADMIN — IBUPROFEN 600 MG: 600 TABLET, FILM COATED ORAL at 05:08

## 2024-03-01 RX ADMIN — IBUPROFEN 600 MG: 600 TABLET, FILM COATED ORAL at 18:11

## 2024-03-01 RX ADMIN — IBUPROFEN 600 MG: 600 TABLET, FILM COATED ORAL at 11:40

## 2024-03-01 RX ADMIN — ONDANSETRON 4 MG: 4 TABLET, ORALLY DISINTEGRATING ORAL at 05:08

## 2024-03-01 NOTE — PROGRESS NOTES
Postpartum Progress Note     PPD#1    Maylin Abraham is a 28 y.o.  who is postpartum day #1. Patient is doing well and denies any complaints. Patient states pain is well controlled.  Patient states lochia is light. Patient denies any HA, vision changes, RUQ pain. Patient denies any postpartum depression or thoughts of harming herself or others. Patient is breastfeeding.       Vitals:    24 0508   BP: 120/79   Pulse: 102   Resp: 20   Temp: 98.1 °F (36.7 °C)   SpO2:          Physical Exam:  HEENT:Normocephalic and atraumatic, Thyroid WNL,   Lungs: CTA B/L, negative W/W/R   Abdomen: Soft, appropriate tenderness to palpation, Uterine fundus firm and below the umbilicus  Extremities: Negative swelling or cyanosis, DTRs WNL, negative clonus    Recent Results (from the past 24 hour(s))   Urine Drug Screen - Urine, Clean Catch    Collection Time: 24  7:45 AM    Specimen: Urine, Clean Catch   Result Value Ref Range    Amphet/Methamphet, Screen Negative Negative    Barbiturates Screen, Urine Negative Negative    Benzodiazepine Screen, Urine Negative Negative    Cocaine Screen, Urine Negative Negative    Opiate Screen Negative Negative    THC, Screen, Urine Negative Negative    Methadone Screen, Urine Negative Negative    Oxycodone Screen, Urine Negative Negative    Fentanyl, Urine Negative Negative   CBC (No Diff)    Collection Time: 24  8:03 AM    Specimen: Arm, Left; Blood   Result Value Ref Range    WBC 16.21 (H) 3.40 - 10.80 10*3/mm3    RBC 3.90 3.77 - 5.28 10*6/mm3    Hemoglobin 10.1 (L) 12.0 - 15.9 g/dL    Hematocrit 31.6 (L) 34.0 - 46.6 %    MCV 81.0 79.0 - 97.0 fL    MCH 25.9 (L) 26.6 - 33.0 pg    MCHC 32.0 31.5 - 35.7 g/dL    RDW 13.8 12.3 - 15.4 %    RDW-SD 40.6 37.0 - 54.0 fl    MPV 13.9 (H) 6.0 - 12.0 fL    Platelets 239 140 - 450 10*3/mm3   T Pallidum Antibody w/ reflex RPR (Syphilis)    Collection Time: 24  8:03 AM    Specimen: Arm, Left; Blood   Result Value Ref Range     Treponemal AB Total Non-Reactive Non-Reactive   Type & Screen    Collection Time: 24  8:03 AM    Specimen: Arm, Left; Blood   Result Value Ref Range    ABO Type A     RH type Positive     Antibody Screen Negative     T&S Expiration Date 3/3/2024 11:59:59 PM    ]    ASSESSMENT/PLAN:    Patient is a 28 y.o.female who is PPD# 1s/p    -Rh+/Rubella non-immune-MMR prior to discharge    -Encourage ambulation    -breastfeeding    -Patient denies any complaints, continue postpartum care        Megan Rivero DO  3/1/2024 07:22 EST

## 2024-03-01 NOTE — ANESTHESIA POSTPROCEDURE EVALUATION
Patient: Maylin Abraham    Procedure Summary       Date: 02/29/24 Room / Location:     Anesthesia Start: 1444 Anesthesia Stop: 1704    Procedure: LABOR ANALGESIA Diagnosis:     Scheduled Providers:  Provider: Brooke Santiago CRNA    Anesthesia Type: epidural ASA Status: 2            Anesthesia Type: epidural    Vitals  Vitals Value Taken Time   /80 03/01/24 0900   Temp 36.9 °C (98.4 °F) 03/01/24 0900   Pulse 89 03/01/24 0900   Resp 16 03/01/24 0900   SpO2 100 % 02/29/24 1733   Vitals shown include unfiled device data.        Post Anesthesia Care and Evaluation    Patient location during evaluation: bedside  Patient participation: complete - patient participated  Level of consciousness: awake  Pain score: 0  Pain management: adequate    Airway patency: patent  Anesthetic complications: No anesthetic complications  PONV Status: controlled  Cardiovascular status: acceptable and stable  Respiratory status: acceptable  Hydration status: acceptable  Post Neuraxial Block status: Motor and sensory function returned to baseline and No signs or symptoms of PDPH

## 2024-03-01 NOTE — OUTREACH NOTE
[facility]  IP Postpartum    Questions/Answers      Flowsheet Row Responses   Best Method for Contacting Cell   Support Person Present Yes   Does the patient have a car seat at the hospital Yes   Delivery Note Reviewed Reviewed   Were birth expectations met? Yes   Is there a need for additional support/resources? No   Lactation Note Reviewed Reviewed   Is additional support needed? No   Any questions or concerns? No   Is the patient going to use Meds to Beds? Yes   Any concerns related discharge meds/ability to  prescriptions? No   OB Discharge Navigator Reviewed  Reviewed   Confirm Postpartum OB appointment Yes   Confirm initial well-child Pediatrician appointment date/time: No   Additional post-discharge F/U appointments No   Does patient have transportation to appointments? Yes   Any other assistance needed to ensure she is able to attend appointments? No   Does patient have supplies needed at home for  care? Breast Pump, Clothing, Crib, Diapers            Sydnee Petit RN  Maternity Nurse Navigator    3/1/2024, 16:35 EST

## 2024-03-01 NOTE — PLAN OF CARE
Problem: Adult Inpatient Plan of Care  Goal: Plan of Care Review  Outcome: Ongoing, Progressing  Goal: Patient-Specific Goal (Individualized)  Outcome: Ongoing, Progressing  Goal: Absence of Hospital-Acquired Illness or Injury  Outcome: Ongoing, Progressing  Intervention: Identify and Manage Fall Risk  Recent Flowsheet Documentation  Taken 3/1/2024 1200 by Ama Rodgers RN  Safety Promotion/Fall Prevention: safety round/check completed  Taken 3/1/2024 1000 by Ama Rodgers RN  Safety Promotion/Fall Prevention: safety round/check completed  Taken 3/1/2024 0800 by Ama Rodgers RN  Safety Promotion/Fall Prevention: safety round/check completed  Intervention: Prevent and Manage VTE (Venous Thromboembolism) Risk  Recent Flowsheet Documentation  Taken 3/1/2024 0800 by Ama Rodgers RN  Activity Management: up ad brock  Goal: Optimal Comfort and Wellbeing  Outcome: Ongoing, Progressing  Intervention: Monitor Pain and Promote Comfort  Recent Flowsheet Documentation  Taken 3/1/2024 1000 by Ama Rodgers RN  Pain Management Interventions: heat applied  Goal: Readiness for Transition of Care  Outcome: Ongoing, Progressing     Problem: Adjustment to Role Transition (Postpartum Vaginal Delivery)  Goal: Successful Maternal Role Transition  Outcome: Ongoing, Progressing     Problem: Bleeding (Postpartum Vaginal Delivery)  Goal: Hemostasis  Outcome: Ongoing, Progressing     Problem: Infection (Postpartum Vaginal Delivery)  Goal: Absence of Infection Signs/Symptoms  Outcome: Ongoing, Progressing     Problem: Pain (Postpartum Vaginal Delivery)  Goal: Acceptable Pain Control  Outcome: Ongoing, Progressing  Intervention: Prevent or Manage Pain  Recent Flowsheet Documentation  Taken 3/1/2024 1000 by Ama Rodgers RN  Pain Management Interventions: heat applied     Problem: Urinary Retention (Postpartum Vaginal Delivery)  Goal: Effective Urinary Elimination  Outcome: Ongoing, Progressing     Problem: Breastfeeding  Goal:  Effective Breastfeeding  Outcome: Ongoing, Progressing   Goal Outcome Evaluation:

## 2024-03-01 NOTE — DISCHARGE SUMMARY
OB Discharge Summary        Admit Date:  2024  Date of Delivery: 2024   Discharge Date: {Discharge Date:}      Reason for Admission:  Elective IOL     Final Diagnosis:  Patient Active Problem List   Diagnosis    Supervision of other normal pregnancy, antepartum    Hx of preeclampsia, prior pregnancy, currently pregnant    Tobacco abuse    Bicornuate uterus affecting pregnancy, antepartum    Anemia of mother during pregnancy, delivered    Encounter for induction of labor       To do at FU: 5-6 weeks pp    Antepartum:  Prenatal care is complicated by:  See above Final Diagnosis for list    Intrapartum/Delivery:  OB Surgeon: Dr. Rivero  Anesthesia: Epidural  Delivery Type:   Perineum : Intact  Feeding method: Breast    Infant: female  infant; Viable     Weight: 3190 g (7 lb 0.5 oz)      APGARS: 8  @ 1 minute / 9  @ 5 minutes    Hospital Course/Significant Findings:  Patient is a 28 y.o.  who presented for elective IOL. She received cooks balloon with pitocin for induction methods. She received epidural and progressed to complete. She had a normal  without any complications. She had an uncomplicated postpartum stay.**    {DISCHARGE PP/PO NORMAL EXAM (Optional):89967}    Recent Results (from the past 72 hour(s))   Urine Drug Screen - Urine, Clean Catch    Collection Time: 24  7:45 AM    Specimen: Urine, Clean Catch   Result Value Ref Range    Amphet/Methamphet, Screen Negative Negative    Barbiturates Screen, Urine Negative Negative    Benzodiazepine Screen, Urine Negative Negative    Cocaine Screen, Urine Negative Negative    Opiate Screen Negative Negative    THC, Screen, Urine Negative Negative    Methadone Screen, Urine Negative Negative    Oxycodone Screen, Urine Negative Negative    Fentanyl, Urine Negative Negative   CBC (No Diff)    Collection Time: 24  8:03 AM    Specimen: Arm, Left; Blood   Result Value Ref Range    WBC 16.21 (H) 3.40 - 10.80 10*3/mm3    RBC 3.90 3.77 -  5.28 10*6/mm3    Hemoglobin 10.1 (L) 12.0 - 15.9 g/dL    Hematocrit 31.6 (L) 34.0 - 46.6 %    MCV 81.0 79.0 - 97.0 fL    MCH 25.9 (L) 26.6 - 33.0 pg    MCHC 32.0 31.5 - 35.7 g/dL    RDW 13.8 12.3 - 15.4 %    RDW-SD 40.6 37.0 - 54.0 fl    MPV 13.9 (H) 6.0 - 12.0 fL    Platelets 239 140 - 450 10*3/mm3   T Pallidum Antibody w/ reflex RPR (Syphilis)    Collection Time: 02/29/24  8:03 AM    Specimen: Arm, Left; Blood   Result Value Ref Range    Treponemal AB Total Non-Reactive Non-Reactive   Type & Screen    Collection Time: 02/29/24  8:03 AM    Specimen: Arm, Left; Blood   Result Value Ref Range    ABO Type A     RH type Positive     Antibody Screen Negative     T&S Expiration Date 3/3/2024 11:59:59 PM    ]    Physical Exam:  HEENT:Normocephalic and atraumatic, Thyroid WNL,   Lungs: CTA B/L, negative W/W/R   Abdomen: Soft, appropriate tenderness to palpation, Uterine fundus firm and below the umbilicus  Extremities: Negative swelling or cyanosis, DTRs WNL, negative clonus  Vitals:    03/01/24 0508   BP: 120/79   Pulse: 102   Resp: 20   Temp: 98.1 °F (36.7 °C)   SpO2:        Discharge:         Discharge Medications        ASK your doctor about these medications        Instructions Start Date   docusate sodium 100 MG capsule  Commonly known as: Colace   100 mg, Oral, 2 Times Daily PRN      ondansetron ODT 8 MG disintegrating tablet  Commonly known as: ZOFRAN-ODT   dissolve 1 tablet under the tongue EVERY 8 HOURS AS NEEDED FOR NAUSEA AND VOMITING      Prenatal 27-1 27-1 MG tablet tablet   1 tablet, Oral, Daily                 Disposition: Home  Diet: Regular    Pelvic Rest: 6 weeks    Condition at discharge: Good    Follow up with: Megan Rivero DO or provider of her choice    Follow up in: 5 weeks      Complications: None      Electronically signed by:    Megan Rivero DO  03/01/24  07:23 [facility]  [address] [place] [address]  Dept: [phone]  Dept  Fax: [phone]  Loc: [phone]

## 2024-03-01 NOTE — PLAN OF CARE
Problem: Adult Inpatient Plan of Care  Goal: Plan of Care Review  Outcome: Ongoing, Progressing  Goal: Patient-Specific Goal (Individualized)  Outcome: Ongoing, Progressing  Goal: Absence of Hospital-Acquired Illness or Injury  Outcome: Ongoing, Progressing  Intervention: Identify and Manage Fall Risk  Recent Flowsheet Documentation  Taken 3/1/2024 0217 by Jocelyn Choudhary RN  Safety Promotion/Fall Prevention: safety round/check completed  Taken 3/1/2024 0135 by Jocelny Choudhary RN  Safety Promotion/Fall Prevention: safety round/check completed  Taken 2/29/2024 2349 by Jocelyn Choudhary RN  Safety Promotion/Fall Prevention: safety round/check completed  Taken 2/29/2024 2110 by Jocelyn Choudhary RN  Safety Promotion/Fall Prevention: safety round/check completed  Intervention: Prevent Skin Injury  Recent Flowsheet Documentation  Taken 2/29/2024 2110 by Jocelyn Choudhary RN  Body Position: position changed independently  Intervention: Prevent and Manage VTE (Venous Thromboembolism) Risk  Recent Flowsheet Documentation  Taken 2/29/2024 2110 by Jocelyn Choudhary RN  Activity Management: up ad brock  VTE Prevention/Management: (education provided to patient)   bilateral   sequential compression devices off  Range of Motion: active ROM (range of motion) encouraged  Intervention: Prevent Infection  Recent Flowsheet Documentation  Taken 2/29/2024 2110 by Jocelyn Choudhary RN  Infection Prevention:   cohorting utilized   environmental surveillance performed   equipment surfaces disinfected   hand hygiene promoted   personal protective equipment utilized   rest/sleep promoted   single patient room provided   visitors restricted/screened  Goal: Optimal Comfort and Wellbeing  Outcome: Ongoing, Progressing  Intervention: Monitor Pain and Promote Comfort  Recent Flowsheet Documentation  Taken 3/1/2024 0508 by Jocelyn Choudhary RN  Pain Management Interventions:   see MAR   pain management plan reviewed with patient/caregiver   quiet  environment facilitated  Taken 3/1/2024 0217 by Jocelyn Choudhary RN  Pain Management Interventions:   see MAR   quiet environment facilitated  Taken 2/29/2024 2349 by Jocelyn Choudhary RN  Pain Management Interventions:   see MAR   pain management plan reviewed with patient/caregiver   quiet environment facilitated  Intervention: Provide Person-Centered Care  Recent Flowsheet Documentation  Taken 2/29/2024 2110 by Jocelyn Choudhary RN  Trust Relationship/Rapport:   care explained   choices provided   emotional support provided   empathic listening provided   questions answered   questions encouraged   reassurance provided   thoughts/feelings acknowledged  Goal: Readiness for Transition of Care  Outcome: Ongoing, Progressing     Problem: Adjustment to Role Transition (Postpartum Vaginal Delivery)  Goal: Successful Maternal Role Transition  Outcome: Ongoing, Progressing     Problem: Bleeding (Postpartum Vaginal Delivery)  Goal: Hemostasis  Outcome: Ongoing, Progressing     Problem: Infection (Postpartum Vaginal Delivery)  Goal: Absence of Infection Signs/Symptoms  Outcome: Ongoing, Progressing  Intervention: Prevent or Manage Infection  Recent Flowsheet Documentation  Taken 2/29/2024 2110 by Jocelyn Choudhary RN  Perineal Care:   perineum cleansed   absorbent brief/pad changed   perineal spray bottle/warm water use encouraged     Problem: Pain (Postpartum Vaginal Delivery)  Goal: Acceptable Pain Control  Outcome: Ongoing, Progressing  Intervention: Prevent or Manage Pain  Recent Flowsheet Documentation  Taken 3/1/2024 0508 by Jocelyn Choudhary RN  Pain Management Interventions:   see MAR   pain management plan reviewed with patient/caregiver   quiet environment facilitated  Taken 3/1/2024 0217 by Jocelyn Choudhary RN  Pain Management Interventions:   see MAR   quiet environment facilitated  Taken 2/29/2024 2349 by Jocelyn Choudhary RN  Pain Management Interventions:   see MAR   pain management plan reviewed with  patient/caregiver   quiet environment facilitated     Problem: Urinary Retention (Postpartum Vaginal Delivery)  Goal: Effective Urinary Elimination  Outcome: Ongoing, Progressing   Goal Outcome Evaluation:

## 2024-03-02 VITALS
RESPIRATION RATE: 16 BRPM | HEIGHT: 63 IN | OXYGEN SATURATION: 100 % | SYSTOLIC BLOOD PRESSURE: 111 MMHG | WEIGHT: 175 LBS | DIASTOLIC BLOOD PRESSURE: 63 MMHG | HEART RATE: 83 BPM | BODY MASS INDEX: 31.01 KG/M2 | TEMPERATURE: 97.9 F

## 2024-03-02 LAB
FLUAV SUBTYP SPEC NAA+PROBE: NOT DETECTED
FLUBV RNA ISLT QL NAA+PROBE: NOT DETECTED
RSV RNA NPH QL NAA+NON-PROBE: NOT DETECTED
S PYO AG THROAT QL: NEGATIVE
SARS-COV-2 RNA RESP QL NAA+PROBE: NOT DETECTED

## 2024-03-02 PROCEDURE — 63710000001 ONDANSETRON ODT 4 MG TABLET DISPERSIBLE: Performed by: OBSTETRICS & GYNECOLOGY

## 2024-03-02 PROCEDURE — 87637 SARSCOV2&INF A&B&RSV AMP PRB: CPT | Performed by: OBSTETRICS & GYNECOLOGY

## 2024-03-02 PROCEDURE — 87880 STREP A ASSAY W/OPTIC: CPT | Performed by: OBSTETRICS & GYNECOLOGY

## 2024-03-02 PROCEDURE — 87081 CULTURE SCREEN ONLY: CPT | Performed by: OBSTETRICS & GYNECOLOGY

## 2024-03-02 PROCEDURE — 0503F POSTPARTUM CARE VISIT: CPT | Performed by: OBSTETRICS & GYNECOLOGY

## 2024-03-02 PROCEDURE — 87147 CULTURE TYPE IMMUNOLOGIC: CPT | Performed by: OBSTETRICS & GYNECOLOGY

## 2024-03-02 RX ORDER — ACETAMINOPHEN 325 MG/1
650 TABLET ORAL EVERY 6 HOURS PRN
Qty: 30 TABLET | Refills: 1 | Status: SHIPPED | OUTPATIENT
Start: 2024-03-02

## 2024-03-02 RX ORDER — IBUPROFEN 600 MG/1
600 TABLET ORAL EVERY 6 HOURS SCHEDULED
Qty: 30 TABLET | Refills: 1 | Status: SHIPPED | OUTPATIENT
Start: 2024-03-02

## 2024-03-02 RX ADMIN — ONDANSETRON 4 MG: 4 TABLET, ORALLY DISINTEGRATING ORAL at 06:38

## 2024-03-02 RX ADMIN — DOCUSATE SODIUM 100 MG: 100 CAPSULE, LIQUID FILLED ORAL at 09:17

## 2024-03-02 RX ADMIN — IBUPROFEN 600 MG: 600 TABLET, FILM COATED ORAL at 01:05

## 2024-03-02 RX ADMIN — VITAMIN A, VITAMIN C, VITAMIN D, VITAMIN E, THIAMINE, RIBOFLAVIN, NIACIN, VITAMIN B6, FOLIC ACID, VITAMIN B12, CALCIUM, IRON, ZINC, COPPER 1 TABLET: 4000; 120; 400; 22; 1.84; 3; 20; 10; 1; 12; 200; 27; 25; 2 TABLET ORAL at 09:17

## 2024-03-02 RX ADMIN — IBUPROFEN 600 MG: 600 TABLET, FILM COATED ORAL at 11:17

## 2024-03-02 NOTE — LACTATION NOTE
Pt is pumping and supplementing some formula as well as latching. Pt states some tenderness with initial latch. Discussed attempting to feed baby every 3 hours, allowing unlimited access to breast with unlimited time on breast. Encouraged to do awake, skin to skin as much as possible. LC discussed  behaviors for the first two weeks of breastfeeding, discussed breastfeeding expectations and encouraged her to breastfeed/pump frequently for good milk supply. LC discussed nipple care, plugged ducts, engorgement, and breast infection. D/C instructions gone over, included hand hygiene, respiratory hygiene and breastfeeding when pt is sick, LC encouraged pt to see the pediatrician within two days of discharge for follow up. LC informed pt that LC was available after D/C for assistance with breastfeeding

## 2024-03-02 NOTE — PLAN OF CARE
Problem: Adult Inpatient Plan of Care  Goal: Plan of Care Review  Outcome: Met  Goal: Patient-Specific Goal (Individualized)  Outcome: Met  Goal: Absence of Hospital-Acquired Illness or Injury  Outcome: Met  Intervention: Identify and Manage Fall Risk  Recent Flowsheet Documentation  Taken 3/2/2024 0940 by Nayana Grullon RN  Safety Promotion/Fall Prevention: safety round/check completed  Taken 3/2/2024 0705 by Nayana Grullon RN  Safety Promotion/Fall Prevention: safety round/check completed  Intervention: Prevent and Manage VTE (Venous Thromboembolism) Risk  Recent Flowsheet Documentation  Taken 3/2/2024 1000 by Nayana Grullon RN  Activity Management: up ad brock  VTE Prevention/Management:   sequential compression devices off   patient refused intervention  Goal: Optimal Comfort and Wellbeing  Outcome: Met  Goal: Readiness for Transition of Care  Outcome: Met  Intervention: Mutually Develop Transition Plan  Recent Flowsheet Documentation  Taken 3/2/2024 1341 by Nayana Grullon RN  Equipment Needed After Discharge: none  Anticipated Changes Related to Illness: none  Transportation Anticipated: family or friend will provide  Transportation Concerns: none  Patient/Family Anticipated Services at Transition: none  Patient/Family Anticipates Transition to: home  Taken 3/2/2024 1340 by Nayana Grullon, RN  Transportation Anticipated: family or friend will provide  Patient/Family Anticipated Services at Transition: none  Patient/Family Anticipates Transition to: home     Problem: Adjustment to Role Transition (Postpartum Vaginal Delivery)  Goal: Successful Maternal Role Transition  Outcome: Met     Problem: Bleeding (Postpartum Vaginal Delivery)  Goal: Hemostasis  Outcome: Met     Problem: Infection (Postpartum Vaginal Delivery)  Goal: Absence of Infection Signs/Symptoms  Outcome: Met     Problem: Pain (Postpartum Vaginal Delivery)  Goal: Acceptable Pain Control  Outcome: Met     Problem: Urinary Retention (Postpartum  Vaginal Delivery)  Goal: Effective Urinary Elimination  Outcome: Met     Problem: Breastfeeding  Goal: Effective Breastfeeding  Outcome: Met   Goal Outcome Evaluation:

## 2024-03-02 NOTE — SIGNIFICANT NOTE
C/O nausea with sore throat, states has a history of strep throat and feels like she has strep throat. Will monitor

## 2024-03-02 NOTE — PLAN OF CARE
Problem: Adult Inpatient Plan of Care  Goal: Plan of Care Review  Outcome: Ongoing, Progressing  Goal: Patient-Specific Goal (Individualized)  Outcome: Ongoing, Progressing  Goal: Absence of Hospital-Acquired Illness or Injury  Outcome: Ongoing, Progressing  Intervention: Identify and Manage Fall Risk  Recent Flowsheet Documentation  Taken 3/2/2024 0330 by Diana Perez RN  Safety Promotion/Fall Prevention:   nonskid shoes/slippers when out of bed   safety round/check completed  Taken 3/2/2024 0215 by Diana Perez RN  Safety Promotion/Fall Prevention:   nonskid shoes/slippers when out of bed   safety round/check completed  Taken 3/2/2024 0130 by Diana Perez RN  Safety Promotion/Fall Prevention:   nonskid shoes/slippers when out of bed   safety round/check completed  Taken 3/2/2024 0050 by Diana Perez RN  Safety Promotion/Fall Prevention:   nonskid shoes/slippers when out of bed   safety round/check completed  Taken 3/1/2024 2340 by Diana Perez RN  Safety Promotion/Fall Prevention:   nonskid shoes/slippers when out of bed   safety round/check completed  Taken 3/1/2024 2230 by Diana Perez RN  Safety Promotion/Fall Prevention:   nonskid shoes/slippers when out of bed   safety round/check completed  Taken 3/1/2024 2130 by Diana Perez RN  Safety Promotion/Fall Prevention:   nonskid shoes/slippers when out of bed   safety round/check completed  Taken 3/1/2024 1915 by Diana Perez RN  Safety Promotion/Fall Prevention:   nonskid shoes/slippers when out of bed   safety round/check completed  Intervention: Prevent and Manage VTE (Venous Thromboembolism) Risk  Recent Flowsheet Documentation  Taken 3/1/2024 2130 by Diana Perez RN  Activity Management: up ad brock  Range of Motion: active ROM (range of motion) encouraged  Goal: Optimal Comfort and Wellbeing  Outcome: Ongoing, Progressing  Intervention: Provide Person-Centered Care  Recent Flowsheet Documentation  Taken  3/1/2024 2130 by Diana Perez, RN  Trust Relationship/Rapport:   care explained   choices provided   emotional support provided   empathic listening provided   questions answered   questions encouraged   reassurance provided   thoughts/feelings acknowledged  Goal: Readiness for Transition of Care  Outcome: Ongoing, Progressing     Problem: Adjustment to Role Transition (Postpartum Vaginal Delivery)  Goal: Successful Maternal Role Transition  Outcome: Ongoing, Progressing     Problem: Bleeding (Postpartum Vaginal Delivery)  Goal: Hemostasis  Outcome: Ongoing, Progressing     Problem: Infection (Postpartum Vaginal Delivery)  Goal: Absence of Infection Signs/Symptoms  Outcome: Ongoing, Progressing     Problem: Pain (Postpartum Vaginal Delivery)  Goal: Acceptable Pain Control  Outcome: Ongoing, Progressing     Problem: Urinary Retention (Postpartum Vaginal Delivery)  Goal: Effective Urinary Elimination  Outcome: Ongoing, Progressing     Problem: Breastfeeding  Goal: Effective Breastfeeding  Outcome: Ongoing, Progressing   Goal Outcome Evaluation:         + bonding noted with baby  Voiding without difficulty  Tolerating po fluids and regular diet  Ambulating without difficulty  SO at bedside

## 2024-03-02 NOTE — PROGRESS NOTES
"PostPartum/PostOp PROGRESS NOTE        Subjective:  Pt states woke up this am with sore throat.  \"Feels like she has strep throat.\". Pt states has history frequent strep throat.  She is breast feeding and is concerned she may pass to baby.    Objective:  Vitals: Blood pressure 111/63, pulse 83, temperature 97.9 °F (36.6 °C), temperature source Oral, resp. rate 16, height 160 cm (63\"), weight 79.4 kg (175 lb), last menstrual period 2023, SpO2 100%, currently breastfeeding.          General: NAD, alert and oriented, appropriate  Psych: Normal mood, appropriate  Abdomen: Fundus firm, non tender and Fundus at U-1  Extremeties: Trace edema    Labs:  Lab Results (last 24 hours)       ** No results found for the last 24 hours. **              Assessment:    Post-partum/postop Day:  2  Status post   Pharyngitis    Plan:   Routine postpartum/postop care  Ambulate, Breast feeding support, Discharge home, DC meds reviewed, Follow up scheduled, PP/PO precautions given      Electronically signed by Kaci Orozco DO, 24, 12:22 PM EST.  " 163.83

## 2024-03-03 LAB — BACTERIA SPEC AEROBE CULT: ABNORMAL

## 2024-03-09 ENCOUNTER — TELEPHONE (OUTPATIENT)
Dept: LACTATION | Facility: HOSPITAL | Age: 29
End: 2024-03-09
Payer: COMMERCIAL

## 2024-03-09 NOTE — TELEPHONE ENCOUNTER
Pt states breastfeeding is going much better now. She is pumping after feedings and getting 8oz. Baby is gaining wt. She does give some pumped milk at night. Pt has no questions or concerns, encouraged to contact LC as needed.

## 2024-03-15 ENCOUNTER — PATIENT OUTREACH (OUTPATIENT)
Dept: LABOR AND DELIVERY | Facility: HOSPITAL | Age: 29
End: 2024-03-15
Payer: COMMERCIAL

## 2024-03-15 NOTE — OUTREACH NOTE
Motherhood Connection  Postpartum Check-In    Questions/Answers      Flowsheet Row Responses   Visit Setting Telephone   Best Method for Contacting Cell   OB Discharge Note Reviewed  Reviewed   OB Discharge Navigator Reviewed  Reviewed   OB Discharge Medications Reviewed  Reviewed    discharged home with mother? Yes   Current Pain Levels 0-10 0   At Rest Pain Levels 0-10 0   Pain level with activity 0-10 0   Acceptable Pain Level 0-10 0   Verbalized Emotional State Acceptance   Family/Support Network Family, Significant Other   Level of Involvement in Care Attentive   Do you feel comfortable in your relationship with your baby? Yes   Have members of your household adjusted to your baby? Yes   Is the baby's father supportive and/or involved with the baby? Yes   How does your partner feel about the baby? Happy, Involved   Do you feel safe at home, school and work? Yes   Are you in a relationship with someone who threatens you or hurts you? No   Do you have the resources to keep yourself and your baby healthy and safe? Yes   Lochia (per patient report) Similar to Menstrual Flow   Amount Spotting   Number of pads per day 3   Lochia Odor None   Is patient breastfeeding? Yes, pumping   Frequency q2-3hours   Pumping Quantity 4-6oz   Postpartum Depression Screening Education Education Provided   Doctor Appointments: Education Provided   Breastfeeding Education Education Provided   Family Planning Education Education Provided   Postpartum Care Education Education Provided   S & S to report Education Provided   Infant Feeding Method Expressed Breast Milk, Breast   Frequency of feedings q2-3h   Expressed milk PO (mL) 2-4oz   Expressed milk- frequency of feedings q3-4hours   Number of wet diapers x 24 hours 10   Last BM x 24 hours 8   What safe sleep surface is available? Bassinet   Are there stuffed animals, toys, pillows, quilts, blankets, wedges, positioners, bumpers or other loose bedding in the infant's sleeping  environment? No   Where does the baby usually sleep? Bassinet   Does the baby ever share a sleep surface with a sibling, adult or pet? No   Does the baby ever share a sleep surface in a bed, couch, recliner or other? No   What position do you place your baby to sleep for naps? Back   What position do you place your baby to sleep at night Back            Review of Systems   Constitutional: Negative.    HENT: Negative.     Eyes: Negative.    Respiratory: Negative.     Cardiovascular: Negative.    Gastrointestinal: Negative.    Endocrine: Negative.    Genitourinary: Negative.    Musculoskeletal: Negative.    Skin: Negative.    Allergic/Immunologic: Negative.    Neurological: Negative.    Hematological: Negative.    Psychiatric/Behavioral: Negative.       Most Recent [place]  Depression Scale Score (EPDS)    Performed by a clinician: 5 (3/15/2024  3:33 PM)    Received via [facility] questionnaire: 5 (3/8/2024)     Doing well. No current questions, needs or concerns.     5 Ps Screen  completed      Sydnee Petit RN  Maternity Nurse Navigator    3/15/2024, 15:35 EDT

## 2024-03-22 ENCOUNTER — PATIENT OUTREACH (OUTPATIENT)
Dept: CALL CENTER | Facility: HOSPITAL | Age: 29
End: 2024-03-22
Payer: COMMERCIAL

## 2024-03-22 NOTE — OUTREACH NOTE
Motherhood Connection Survey      Flowsheet Row Responses   [facility] patient discharged from? [facility]   Week 1 attempt successful? Yes   Call start time 1455   Call end time 1501   Baby sex Girl    discharged home with mother? Yes   Baby sex Girl   Delivery type Vaginal   Emotional state Acceptance   Family support Yes   Do you have all necessary resources to care for you and your baby?  Yes   Have members of your household adjusted to your baby? Yes   Lochia amount None   Did you have an episiotomy/tear/abdominal incision? No   Feeding Method Breast   Frequency q2-3 hrs   Duration 15/15 min   Pumping Yes   Storage of Milk Deep freeze   Supplementing Breast Milk   Nursing Interventions Lactation education provided   Signs baby is ready to eat Rooting, Crying   Number of wet diapers x 24 hours 10-12   Last BM x 24 hours 7-8   Umbilical Cord No reported signs or symptoms   Umbilical cord comments cord off   Where does the baby usually sleep? Bassinet   Are there stuffed animals, toys, pillows, quilts, blankets, wedges, positioners, bumpers or other loose bedding in the infant's sleeping environment? No   Does the baby ever share a sleep surface in a bed, couch, recliner or other? No   What position do you lay your baby down to sleep? Back   Are you and/or other caregivers smoking inside or outside the baby's home? No   Mom appointment comments: pp f/u scheduled   Baby appointment comments: Peds visits x2   Additional comments eager to eat   Call completed? Yes   How satisfied were you with the Motherhood Connection Program? 5              Keiko BOB - Registered Nurse

## 2024-03-29 RX ORDER — ONDANSETRON 8 MG/1
TABLET, ORALLY DISINTEGRATING ORAL
COMMUNITY
Start: 2024-03-05

## 2024-04-03 ENCOUNTER — POSTPARTUM VISIT (OUTPATIENT)
Dept: OBSTETRICS AND GYNECOLOGY | Facility: CLINIC | Age: 29
End: 2024-04-03
Payer: COMMERCIAL

## 2024-04-03 VITALS
HEART RATE: 81 BPM | DIASTOLIC BLOOD PRESSURE: 75 MMHG | SYSTOLIC BLOOD PRESSURE: 105 MMHG | WEIGHT: 153 LBS | BODY MASS INDEX: 27.1 KG/M2

## 2024-04-03 NOTE — PROGRESS NOTES
POSTPARTUM Follow Up Visit      Chief Complaint   Patient presents with    Postpartum Care     Ha's, blurred visions.     HPI:      Date of delivery:   Delivery type:            Perineum : Intact  Delivering Provider:   Dr. Megan Rivero      Feeding: Breast and bottle, Pumping  Pain:  No, HA  Vaginal Bleeding:  Yes  Depressed/Anxious:  No  EPDS score: 0   #10: 0  Plans for BC:  Condoms  Weight at last OB OV:  175lb    Last pap date and result: Abnormal, LGSIL with HPV + needs colposcopy scheduled       EPD Scale: Thought of Harming Self: 0-->never  [place]  Depression Score: 0        PHYSICAL EXAM:  /75   Pulse 81   Wt 69.4 kg (153 lb)   LMP 2023 (Approximate)   Breastfeeding Yes   BMI 27.10 kg/m²  7.348 kg (16 lb 3.2 oz)  General- NAD, alert and oriented, appropriate  Psych- Normal mood, good memory, good eye contact  Abdomen- Soft, non distended, non tender, no masses  External genitalia- Normal.    Urethra/Bladder/Vagina- Normal, no masses, non-tender, no prolapse   Cvx- Normal, no lesions, no discharge, no CMT  Uterus- Normal size, shape & consistency.  Non tender, mobile.  Adnexa- Normal, no mass, non-tender    Lymphatic- No palpable groin nodes  Ext- No edema    ASSESSMENT AND PLAN:  Diagnoses and all orders for this visit:    1. Postpartum follow-up (Primary)      Counseling:    All birth control options reviewed in detail.  R/B/A/SE/E of each wrt pts PMHx and prior BC use  May resume intercourse  May resume normal activities  Core strengthening exercises reviewed and recommended  Kegel exercises reviewed and recommended  Ok to return to work/school      Follow Up:  Return in about 4 weeks (around 2024) for Colposcopy.    I spent 20 minutes on the separately reported service of Postpartum. This time is not included in the time used to support the E/M service also reported today.    Megan Rivero DO  2024    [facility] [place] [facility]  [place]  [address] [place] [address]  Dept: [phone]  Dept Fax: [phone]  Loc: [phone]  Loc Fax: [phone]

## 2024-05-08 ENCOUNTER — PROCEDURE VISIT (OUTPATIENT)
Dept: OBSTETRICS AND GYNECOLOGY | Facility: CLINIC | Age: 29
End: 2024-05-08
Payer: COMMERCIAL

## 2024-05-08 VITALS
DIASTOLIC BLOOD PRESSURE: 86 MMHG | BODY MASS INDEX: 26.75 KG/M2 | HEIGHT: 63 IN | WEIGHT: 151 LBS | SYSTOLIC BLOOD PRESSURE: 124 MMHG

## 2024-05-08 DIAGNOSIS — R87.612 LGSIL ON PAP SMEAR OF CERVIX: Primary | ICD-10-CM

## 2024-05-08 LAB
B-HCG UR QL: NEGATIVE
EXPIRATION DATE: NORMAL
INTERNAL NEGATIVE CONTROL: NORMAL
INTERNAL POSITIVE CONTROL: NORMAL
Lab: NORMAL

## 2024-05-08 PROCEDURE — 88305 TISSUE EXAM BY PATHOLOGIST: CPT | Performed by: OBSTETRICS & GYNECOLOGY

## 2024-05-10 LAB
CYTO UR: NORMAL
LAB AP CASE REPORT: NORMAL
LAB AP CLINICAL INFORMATION: NORMAL
PATH REPORT.FINAL DX SPEC: NORMAL
PATH REPORT.GROSS SPEC: NORMAL

## 2024-06-18 ENCOUNTER — TELEPHONE (OUTPATIENT)
Dept: OBSTETRICS AND GYNECOLOGY | Facility: CLINIC | Age: 29
End: 2024-06-18
Payer: COMMERCIAL

## 2024-08-18 PROCEDURE — 87086 URINE CULTURE/COLONY COUNT: CPT | Performed by: FAMILY MEDICINE

## 2024-08-18 PROCEDURE — 87077 CULTURE AEROBIC IDENTIFY: CPT | Performed by: FAMILY MEDICINE

## 2024-08-18 PROCEDURE — 87186 SC STD MICRODIL/AGAR DIL: CPT | Performed by: FAMILY MEDICINE

## 2024-08-21 ENCOUNTER — TELEPHONE (OUTPATIENT)
Dept: URGENT CARE | Facility: CLINIC | Age: 29
End: 2024-08-21
Payer: COMMERCIAL

## 2024-08-21 DIAGNOSIS — N39.0 ACUTE URINARY TRACT INFECTION: Primary | ICD-10-CM

## 2024-08-21 RX ORDER — CLINDAMYCIN HYDROCHLORIDE 300 MG/1
300 CAPSULE ORAL 3 TIMES DAILY
Qty: 21 CAPSULE | Refills: 0 | Status: SHIPPED | OUTPATIENT
Start: 2024-08-21 | End: 2024-08-28

## 2024-08-21 NOTE — TELEPHONE ENCOUNTER
Patient urine culture results returned patient has greater than 100,000 Staphylococcus aureus.  She is currently on Macrobid which was not tested but typically does not treat MRSA.  She is currently breast-feeding I instructed her to pump and dump.  I am going to start her on clindamycin 300 mg TID daily x 7 days.  Patient verbalized understanding of pumping and dumping she wants her medication sent to save [facility] and [facility].

## 2025-01-13 NOTE — PROGRESS NOTES
"GYN Visit    CC:   Chief Complaint   Patient presents with    Possible Pregnancy     Confirmation of pregnancy only been to health department and did urine hcg only        HPI:   29 y.o. Contraception or HRT: Contraception:  None    Patient is here for confirmation of pregnancy.   LMP 10/16/24, JOSEFINA 25.    First positive pregnant test in November, confirmed at health department in December.   Is having nausea, needs refill on zofran.      History: PMHx, Meds, Allergies, PSHx, Social Hx, and POBHx all reviewed and updated.    Review of Systems   Constitutional: Negative.    Gastrointestinal:  Positive for nausea.   Genitourinary:  Positive for amenorrhea.       PHYSICAL EXAM:  /78   Pulse 101   Ht 160 cm (63\")   Wt 78.5 kg (173 lb)   LMP 10/16/2024 (Exact Date)   Breastfeeding No   BMI 30.65 kg/m²      Physical Exam  Vitals and nursing note reviewed.   Constitutional:       Appearance: Normal appearance. She is well-developed and well-groomed.   Abdominal:      Comments: Brief TAS shows viable IUP with +cardiac motion   Neurological:      Mental Status: She is alert.   Psychiatric:         Attention and Perception: Attention and perception normal.         Mood and Affect: Affect normal.         Speech: Speech normal.         Behavior: Behavior is cooperative.         Cognition and Memory: Cognition normal.         ASSESSMENT AND PLAN:  Diagnoses and all orders for this visit:    1. Nausea and vomiting during pregnancy (Primary)  -     ondansetron ODT (ZOFRAN-ODT) 4 MG disintegrating tablet; Place 1 tablet on the tongue Every 8 (Eight) Hours As Needed for Nausea or Vomiting.  Dispense: 30 tablet; Refill: 1    2. Supervision of other normal pregnancy, antepartum  Overview:  JOSEFINA finalized: 25 per LMP, dating scan ordered    Optional testing NIPS,CF/SMA,AFP:  NIPS pending    COVID: declined  Tdap:  RSV:  Flu: declined    Rhogam:  1hr Glucola:  FU RPR w glucola:  ? Desires " Sterilization:    Anatomy US:  FU US:    PROBLEM LIST/PLAN:   Hx of  preeclampsia - baseline labs pending, ASA 81 mg daily    Bicornuate uterus        Orders:  -     Urine Culture - Urine, Urine, Clean Catch  -     Urine Drug Screen - Urine, Clean Catch  -     OB Panel With HIV and RPR  -     Chlamydia trachomatis, Neisseria gonorrhoeae, Trichomonas vaginalis, PCR - Urine, Urine, Clean Catch  -     Jeni Panorama Prenatal Test: Chromosomes 13, 18, 21, X & Y: Triploidy 22Q.11.2 Deletion - Blood,  -     US Ob < 14 Weeks Single or First Gestation; Future    3. Hx of preeclampsia, prior pregnancy, currently pregnant  Overview:  Baseline labs new OB- WNL   ASA 81mg Daily     Orders:  -     Comprehensive Metabolic Panel  -     Protein / Creatinine Ratio, Urine - Urine, Clean Catch  -     aspirin 81 MG EC tablet; Take 1 tablet by mouth Daily.  Dispense: 90 tablet; Refill: 2        Counseling:  FIRST TRIMESTER precautions provided- return to office/ER for pain and/or vaginal bleeding.    Follow Up:  Return in about 2 weeks (around 1/28/2025) for New OB with DEV Bucio.      DEV Guerrero  01/14/2025    [facility] [place] [facility] [place]  [facility]  [address] [place] [address]  Dept: [phone]  Dept Fax: [phone]  Loc: [phone]

## 2025-01-14 ENCOUNTER — OFFICE VISIT (OUTPATIENT)
Dept: OBSTETRICS AND GYNECOLOGY | Facility: CLINIC | Age: 30
End: 2025-01-14
Payer: COMMERCIAL

## 2025-01-14 VITALS
SYSTOLIC BLOOD PRESSURE: 113 MMHG | HEIGHT: 63 IN | HEART RATE: 101 BPM | WEIGHT: 173 LBS | DIASTOLIC BLOOD PRESSURE: 78 MMHG | BODY MASS INDEX: 30.65 KG/M2

## 2025-01-14 DIAGNOSIS — Z34.80 SUPERVISION OF OTHER NORMAL PREGNANCY, ANTEPARTUM: ICD-10-CM

## 2025-01-14 DIAGNOSIS — O21.9 NAUSEA AND VOMITING DURING PREGNANCY: Primary | ICD-10-CM

## 2025-01-14 DIAGNOSIS — O09.299 HX OF PREECLAMPSIA, PRIOR PREGNANCY, CURRENTLY PREGNANT: ICD-10-CM

## 2025-01-14 PROBLEM — Z34.90 ENCOUNTER FOR INDUCTION OF LABOR: Status: RESOLVED | Noted: 2024-02-29 | Resolved: 2025-01-14

## 2025-01-14 LAB
ABO GROUP BLD: NORMAL
ALBUMIN SERPL-MCNC: 4.1 G/DL (ref 3.5–5.2)
ALBUMIN/GLOB SERPL: 1.3 G/DL
ALP SERPL-CCNC: 77 U/L (ref 39–117)
ALT SERPL W P-5'-P-CCNC: 12 U/L (ref 1–33)
AMPHET+METHAMPHET UR QL: NEGATIVE
AMPHETAMINES UR QL: NEGATIVE
ANION GAP SERPL CALCULATED.3IONS-SCNC: 9.1 MMOL/L (ref 5–15)
AST SERPL-CCNC: 21 U/L (ref 1–32)
BARBITURATES UR QL SCN: NEGATIVE
BASOPHILS # BLD AUTO: 0.06 10*3/MM3 (ref 0–0.2)
BASOPHILS NFR BLD AUTO: 0.6 % (ref 0–1.5)
BENZODIAZ UR QL SCN: NEGATIVE
BILIRUB SERPL-MCNC: <0.2 MG/DL (ref 0–1.2)
BLD GP AB SCN SERPL QL: NEGATIVE
BUN SERPL-MCNC: 5 MG/DL (ref 6–20)
BUN/CREAT SERPL: 12.2 (ref 7–25)
BUPRENORPHINE SERPL-MCNC: NEGATIVE NG/ML
CALCIUM SPEC-SCNC: 9.5 MG/DL (ref 8.6–10.5)
CANNABINOIDS SERPL QL: POSITIVE
CHLORIDE SERPL-SCNC: 102 MMOL/L (ref 98–107)
CO2 SERPL-SCNC: 23.9 MMOL/L (ref 22–29)
COCAINE UR QL: NEGATIVE
CREAT SERPL-MCNC: 0.41 MG/DL (ref 0.57–1)
CREAT UR-MCNC: 183 MG/DL
DEPRECATED RDW RBC AUTO: 38.8 FL (ref 37–54)
EGFRCR SERPLBLD CKD-EPI 2021: 138.5 ML/MIN/1.73
EOSINOPHIL # BLD AUTO: 0.18 10*3/MM3 (ref 0–0.4)
EOSINOPHIL NFR BLD AUTO: 1.9 % (ref 0.3–6.2)
ERYTHROCYTE [DISTWIDTH] IN BLOOD BY AUTOMATED COUNT: 13.7 % (ref 12.3–15.4)
FENTANYL UR-MCNC: NEGATIVE NG/ML
GLOBULIN UR ELPH-MCNC: 3.2 GM/DL
GLUCOSE SERPL-MCNC: 77 MG/DL (ref 65–99)
HBV SURFACE AG SERPL QL IA: NORMAL
HCT VFR BLD AUTO: 35.1 % (ref 34–46.6)
HCV AB SER QL: NORMAL
HGB BLD-MCNC: 11.6 G/DL (ref 12–15.9)
HIV 1+2 AB+HIV1 P24 AG SERPL QL IA: NORMAL
IMM GRANULOCYTES # BLD AUTO: 0.03 10*3/MM3 (ref 0–0.05)
IMM GRANULOCYTES NFR BLD AUTO: 0.3 % (ref 0–0.5)
LYMPHOCYTES # BLD AUTO: 2.36 10*3/MM3 (ref 0.7–3.1)
LYMPHOCYTES NFR BLD AUTO: 24.3 % (ref 19.6–45.3)
MCH RBC QN AUTO: 26.2 PG (ref 26.6–33)
MCHC RBC AUTO-ENTMCNC: 33 G/DL (ref 31.5–35.7)
MCV RBC AUTO: 79.2 FL (ref 79–97)
METHADONE UR QL SCN: NEGATIVE
MONOCYTES # BLD AUTO: 0.6 10*3/MM3 (ref 0.1–0.9)
MONOCYTES NFR BLD AUTO: 6.2 % (ref 5–12)
NEUTROPHILS NFR BLD AUTO: 6.47 10*3/MM3 (ref 1.7–7)
NEUTROPHILS NFR BLD AUTO: 66.7 % (ref 42.7–76)
NRBC BLD AUTO-RTO: 0 /100 WBC (ref 0–0.2)
OPIATES UR QL: NEGATIVE
OXYCODONE UR QL SCN: NEGATIVE
PCP UR QL SCN: NEGATIVE
PLATELET # BLD AUTO: 307 10*3/MM3 (ref 140–450)
PMV BLD AUTO: 13 FL (ref 6–12)
POTASSIUM SERPL-SCNC: 3.8 MMOL/L (ref 3.5–5.2)
PROT ?TM UR-MCNC: 27.7 MG/DL
PROT SERPL-MCNC: 7.3 G/DL (ref 6–8.5)
PROT/CREAT UR: 0.15 MG/G{CREAT}
RBC # BLD AUTO: 4.43 10*6/MM3 (ref 3.77–5.28)
RH BLD: POSITIVE
SODIUM SERPL-SCNC: 135 MMOL/L (ref 136–145)
TRICYCLICS UR QL SCN: NEGATIVE
WBC NRBC COR # BLD AUTO: 9.7 10*3/MM3 (ref 3.4–10.8)

## 2025-01-14 PROCEDURE — 80307 DRUG TEST PRSMV CHEM ANLYZR: CPT | Performed by: NURSE PRACTITIONER

## 2025-01-14 PROCEDURE — 86803 HEPATITIS C AB TEST: CPT | Performed by: NURSE PRACTITIONER

## 2025-01-14 PROCEDURE — 87086 URINE CULTURE/COLONY COUNT: CPT | Performed by: NURSE PRACTITIONER

## 2025-01-14 PROCEDURE — 87491 CHLMYD TRACH DNA AMP PROBE: CPT | Performed by: NURSE PRACTITIONER

## 2025-01-14 PROCEDURE — 87661 TRICHOMONAS VAGINALIS AMPLIF: CPT | Performed by: NURSE PRACTITIONER

## 2025-01-14 PROCEDURE — 82570 ASSAY OF URINE CREATININE: CPT | Performed by: NURSE PRACTITIONER

## 2025-01-14 PROCEDURE — 84156 ASSAY OF PROTEIN URINE: CPT | Performed by: NURSE PRACTITIONER

## 2025-01-14 PROCEDURE — 80053 COMPREHEN METABOLIC PANEL: CPT | Performed by: NURSE PRACTITIONER

## 2025-01-14 PROCEDURE — 80081 OBSTETRIC PANEL INC HIV TSTG: CPT | Performed by: NURSE PRACTITIONER

## 2025-01-14 PROCEDURE — 87591 N.GONORRHOEAE DNA AMP PROB: CPT | Performed by: NURSE PRACTITIONER

## 2025-01-14 RX ORDER — ONDANSETRON 4 MG/1
4 TABLET, ORALLY DISINTEGRATING ORAL EVERY 8 HOURS PRN
Qty: 30 TABLET | Refills: 1 | Status: SHIPPED | OUTPATIENT
Start: 2025-01-14

## 2025-01-14 RX ORDER — ONDANSETRON 4 MG/1
TABLET, ORALLY DISINTEGRATING ORAL
COMMUNITY
Start: 2024-12-27 | End: 2025-01-14 | Stop reason: SDUPTHER

## 2025-01-14 RX ORDER — ASPIRIN 81 MG/1
81 TABLET ORAL DAILY
Qty: 90 TABLET | Refills: 2 | Status: SHIPPED | OUTPATIENT
Start: 2025-01-14

## 2025-01-14 NOTE — PROGRESS NOTES
Venipuncture performed in Left Arm  (site) by ESTHER (employee) with good hemostasis. Patient tolerated well. Date 01/14/2025. AF

## 2025-01-15 PROBLEM — R82.5 POSITIVE URINE DRUG SCREEN: Status: ACTIVE | Noted: 2025-01-15

## 2025-01-15 LAB — RPR SER QL: NORMAL

## 2025-01-16 LAB
BACTERIA SPEC AEROBE CULT: NO GROWTH
C TRACH RRNA SPEC QL NAA+PROBE: NEGATIVE
N GONORRHOEA RRNA SPEC QL NAA+PROBE: NEGATIVE
RUBV IGG SERPL IA-ACNC: <0.9 INDEX
T VAGINALIS RRNA SPEC QL NAA+PROBE: NEGATIVE

## 2025-01-17 PROBLEM — O09.899 RUBELLA NON-IMMUNE STATUS, ANTEPARTUM: Status: ACTIVE | Noted: 2025-01-17

## 2025-01-17 PROBLEM — Z28.39 RUBELLA NON-IMMUNE STATUS, ANTEPARTUM: Status: ACTIVE | Noted: 2025-01-17

## 2025-01-21 ENCOUNTER — HOSPITAL ENCOUNTER (OUTPATIENT)
Dept: ULTRASOUND IMAGING | Facility: HOSPITAL | Age: 30
Discharge: HOME | End: 2025-01-21
Admitting: NURSE PRACTITIONER
Payer: COMMERCIAL

## 2025-01-21 ENCOUNTER — TELEPHONE (OUTPATIENT)
Dept: OBSTETRICS AND GYNECOLOGY | Facility: CLINIC | Age: 30
End: 2025-01-21
Payer: COMMERCIAL

## 2025-01-21 DIAGNOSIS — Z34.80 SUPERVISION OF OTHER NORMAL PREGNANCY, ANTEPARTUM: ICD-10-CM

## 2025-01-21 PROCEDURE — 76801 OB US < 14 WKS SINGLE FETUS: CPT

## 2025-01-21 NOTE — TELEPHONE ENCOUNTER
----- Message from Celso Phillisp sent at 1/21/2025  3:38 PM EST -----  Please let patient know her ultrasound shows a living pregnancy which measured 13w4d, confirming her JOSEFINA of 7/23/25.  Heart rate was 157.  Recommend she keep her next scheduled appointment.

## 2025-01-22 ENCOUNTER — TELEPHONE (OUTPATIENT)
Dept: OBSTETRICS AND GYNECOLOGY | Facility: CLINIC | Age: 30
End: 2025-01-22
Payer: COMMERCIAL

## 2025-01-22 NOTE — TELEPHONE ENCOUNTER
----- Message from Celso Phillips sent at 1/22/2025  8:35 AM EST -----  Please let patient know her NIPS screen is negative, fetus is female if she wants to know gender.

## 2025-02-03 ENCOUNTER — INITIAL PRENATAL (OUTPATIENT)
Dept: OBSTETRICS AND GYNECOLOGY | Facility: CLINIC | Age: 30
End: 2025-02-03
Payer: COMMERCIAL

## 2025-02-03 VITALS — SYSTOLIC BLOOD PRESSURE: 117 MMHG | BODY MASS INDEX: 29.58 KG/M2 | DIASTOLIC BLOOD PRESSURE: 78 MMHG | WEIGHT: 167 LBS

## 2025-02-03 DIAGNOSIS — O21.9 NAUSEA AND VOMITING DURING PREGNANCY: ICD-10-CM

## 2025-02-03 DIAGNOSIS — O09.299 HX OF PREECLAMPSIA, PRIOR PREGNANCY, CURRENTLY PREGNANT: ICD-10-CM

## 2025-02-03 DIAGNOSIS — Z34.80 SUPERVISION OF OTHER NORMAL PREGNANCY, ANTEPARTUM: Primary | ICD-10-CM

## 2025-02-03 DIAGNOSIS — R82.5 POSITIVE URINE DRUG SCREEN: ICD-10-CM

## 2025-02-03 LAB
GLUCOSE UR STRIP-MCNC: NEGATIVE MG/DL
PROT UR STRIP-MCNC: NEGATIVE MG/DL

## 2025-02-03 PROCEDURE — G0123 SCREEN CERV/VAG THIN LAYER: HCPCS | Performed by: NURSE PRACTITIONER

## 2025-02-03 PROCEDURE — 87624 HPV HI-RISK TYP POOLED RSLT: CPT | Performed by: NURSE PRACTITIONER

## 2025-02-03 RX ORDER — ONDANSETRON 4 MG/1
4 TABLET, ORALLY DISINTEGRATING ORAL EVERY 8 HOURS PRN
Qty: 30 TABLET | Refills: 1 | Status: SHIPPED | OUTPATIENT
Start: 2025-02-03

## 2025-02-03 NOTE — PROGRESS NOTES
OB Initial Visit    CC- Here for care of current pregnancy, first visit    Subjective:  29 y.o.  presenting for her first obstetrical visit.    LMP: Patient's last menstrual period was 10/16/2024 (exact date).     Complains of  nausea  Prior OBSTETRIC history is significant for: Preeclampsia, bicornate uterus      Reviewed and updated:  OBHx, GYNHx (STDs), PMHx, Medications, Allergies, PSHx, Social Hx, Preventative Hx (PAP), Hx of abuse/safe environment, Vaccine Hx including hx of chickenpox or vaccine, Genetic Hx (pt, FOB, both families).        Objective:  /78   Wt 75.8 kg (167 lb)   LMP 10/16/2024 (Exact Date)   BMI 29.58 kg/m²         General- NAD, alert and oriented, appropriate  Psych- Normal mood, good memory  Neck- No masses, no thyroid enlargement  CV- Regular rhythm, no murnurs  Resp- CTA to bases, no wheezes  Abdomen- Soft, non distended, non tender, no masses    Breast left- deferred  Breast right- deferred    External genitalia- Normal, no lesions  Urethra- Normal, no masses, non tender  Vagina- Normal, no discharge  Bladder- Normal, no masses, non tender  Cvx- Normal, no lesions, no discharge, no CMT  Uterus- Normal shape and consistency, non tender, Consistent with dates,  .    Adnexa- Normal, no mass, non tender    Lymphatic- No palpable neck, axillary, or groin nodes  Ext- No edema, no cyanosis    Skin- No lesions, no rashes, no acanthosis nigricans    Assessment and Plan:  Unknown  Diagnoses and all orders for this visit:    1. Supervision of other normal pregnancy, antepartum (Primary)  Overview:  JOSEFINA finalized: 25 per LMP, 13-week US and ACOG    Optional testing NIPS,CF/SMA,AFP:  NIPS negative    COVID: declined  Tdap:  RSV:  Flu: declined    Rhogam:  1hr Glucola:  FU RPR w glucola:  ? Desires Sterilization:    Anatomy US:  FU US:    PROBLEM LIST/PLAN:   Hx of  preeclampsia - baseline labs normal, ASA 81 mg daily    Bicornuate uterus    UDS +THC at new OB    Rubella  non-immune - plan vaccine after delivery        Assessment & Plan:  Doing well, no complaints  Reviewed prenatal labs  Is using THC edibles for nausea, cessation encouraged  Is not taking ASA 81 mg, doesn't feel she needs it  Anatomy US next visit.    Orders:  -     POC Urinalysis Dipstick  -     US Ob Detail Fetal Anatomy Single or First Gestation; Future  -     IgP, Aptima HPV    2. Hx of preeclampsia, prior pregnancy, currently pregnant  Overview:  Baseline labs new OB- Normal 1/14/25  ASA 81mg Daily     Assessment & Plan:  Has not been taking ASA 81 mg, doesn't feel she needs it  Encouraged to start.       3. Positive urine drug screen  Overview:  Positive THC at new OB    Assessment & Plan:  Using edible THC for nausea, cessation encouraged.          Genetic Screening:   NIPS    Vaccines:   Pt declines FLU vaccine  Declines COVID vaccine    Counseling:   Nutrition discussed, calories, activity/exercise in pregnancy  Discussed dietary restrictions/safety food preparation in pregnancy  Reviewed what to expect prenatal visits, office providers (female and male) and covering [facility] Hospitalists  Appropriate trimester precautions provided, N/V, vag bleeding, cramping  Questions answered    Labs:   Pap collected, labs drawn at previous visit.    Return in about 4 weeks (around 3/3/2025) for [facility] Office, OB follow up, Anatomy ultrasound.      Celso Phillips, APRN  02/03/2025    [facility] [place]  [facility]  [address] [place] [address]  Dept: [phone]  Dept Fax: [phone]  Loc: [phone]

## 2025-02-03 NOTE — ASSESSMENT & PLAN NOTE
Doing well, no complaints  Reviewed prenatal labs  Is using THC edibles for nausea, cessation encouraged  Is not taking ASA 81 mg, doesn't feel she needs it  Anatomy US next visit.

## 2025-02-04 ENCOUNTER — REFERRAL TRIAGE (OUTPATIENT)
Dept: LABOR AND DELIVERY | Facility: HOSPITAL | Age: 30
End: 2025-02-04
Payer: COMMERCIAL

## 2025-02-07 LAB
CYTOLOGIST CVX/VAG CYTO: NORMAL
CYTOLOGY CVX/VAG DOC CYTO: NORMAL
CYTOLOGY CVX/VAG DOC THIN PREP: NORMAL
DX ICD CODE: NORMAL
HPV I/H RISK 4 DNA CVX QL PROBE+SIG AMP: NEGATIVE
OTHER STN SPEC: NORMAL
SERVICE CMNT-IMP: NORMAL
STAT OF ADQ CVX/VAG CYTO-IMP: NORMAL

## 2025-03-05 NOTE — PROGRESS NOTES
Routine Prenatal Visit     Subjective  Maylin Abraham is a 29 y.o.  at 20w1d here for her routine OB visit.   She is taking her prenatal vitamins.Reports no loss of fluid or vaginal bleeding. Patient doing well.     Pregnancy is complicated by:     Patient Active Problem List   Diagnosis    Supervision of other normal pregnancy, antepartum    Hx of preeclampsia, prior pregnancy, currently pregnant    Tobacco abuse    Bicornuate uterus affecting pregnancy, antepartum    Positive urine drug screen    Rubella non-immune status, antepartum         OB History    Para Term  AB Living   4 2 2   1 2   SAB IAB Ectopic Molar Multiple Live Births   1       0 2      # Outcome Date GA Lbr Jeremiah/2nd Weight Sex Type Anes PTL Lv   4 Current            3 Term 24 39w0d 08:29  00:14 3190 g (7 lb 0.5 oz) F Vag-Spont EPI N NOLAN   2 SAB 23     SAB      1 Term 20 38w0d  2722 g (6 lb) M Vag-Spont   NOLAN           ROS:     General ROS: negative for - chills or fatigue  Genito-Urinary ROS: negative for  change in urinary stream, vaginal discharge     Objective  Physical Exam:    Vitals:    25 1146   BP: 108/73       Uterine Size: not examined, US today  FHT: 110-160 BPM         Assessment/Plan:   Diagnoses and all orders for this visit:    1. Supervision of other normal pregnancy, antepartum (Primary)  Assessment & Plan:  JOSEFINA finalized: 25 per LMP, 13-week US and ACOG    Optional testing NIPS,CF/SMA,AFP:  NIPS negative    COVID: declined  Tdap:  RSV:  Flu: declined    Rhogam: A positive  1hr Glucola:  FU RPR w glucola:  ? Desires Sterilization:    Anatomy US: 3/6 EFW 32%, AC 41%, cephalic, anterior grade 1 placenta, normal anatomy  FU US:    PROBLEM LIST/PLAN:   Hx of  preeclampsia - baseline labs normal, ASA 81 mg daily    Bicornuate uterus    UDS +THC at new OB    Rubella non-immune - plan vaccine after delivery    Orders:  -     POC Urinalysis Dipstick    2. Hx of preeclampsia, prior  pregnancy, currently pregnant    3. Nausea and vomiting during pregnancy  -     ondansetron ODT (ZOFRAN-ODT) 4 MG disintegrating tablet; Place 1 tablet on the tongue Every 8 (Eight) Hours As Needed for Nausea or Vomiting.  Dispense: 30 tablet; Refill: 1          Counseling:   Second trimester precautions  Round Ligament Pain:  The uterus has several ligaments which provide support and keep the uterus in place. As the  uterus grows these ligaments are pulled and stretched which often causes sharp stabbing like pain in the inguinal area.   You may find a pregnancy support band helpful. Changing positions may also help. Yoga is a great way to cope with round ligament and low back pain in pregnancy.    Massage may also help with low back pain   Things to Consider at this Point in your Pregnancy:  Some women experience swelling in their feet during pregnancy. Compression stockings may help  Drink plenty of water and stay active   Make sure you are eating frequent small meals, nuts are a wonderful snack to keep with you            Return in about 4 weeks (around 4/3/2025) for Routine OB visit.      We have gone over prenatal care to include the timing and content of visits. I informed her how to contact the office and/or on call person in the event of any problems and encouraged her to do so when she feels it is necessary.  We then spent time answering her questions which she indicated were answered to her satisfaction.    Marcy Rankin,   3/6/2025 14:42 EST

## 2025-03-06 ENCOUNTER — ROUTINE PRENATAL (OUTPATIENT)
Dept: OBSTETRICS AND GYNECOLOGY | Facility: CLINIC | Age: 30
End: 2025-03-06
Payer: COMMERCIAL

## 2025-03-06 VITALS — DIASTOLIC BLOOD PRESSURE: 73 MMHG | SYSTOLIC BLOOD PRESSURE: 108 MMHG | WEIGHT: 170 LBS | BODY MASS INDEX: 30.11 KG/M2

## 2025-03-06 DIAGNOSIS — O09.299 HX OF PREECLAMPSIA, PRIOR PREGNANCY, CURRENTLY PREGNANT: ICD-10-CM

## 2025-03-06 DIAGNOSIS — O21.9 NAUSEA AND VOMITING DURING PREGNANCY: ICD-10-CM

## 2025-03-06 DIAGNOSIS — Z34.80 SUPERVISION OF OTHER NORMAL PREGNANCY, ANTEPARTUM: Primary | ICD-10-CM

## 2025-03-06 LAB
GLUCOSE UR STRIP-MCNC: NEGATIVE MG/DL
PROT UR STRIP-MCNC: ABNORMAL MG/DL

## 2025-03-06 RX ORDER — ONDANSETRON 4 MG/1
4 TABLET, ORALLY DISINTEGRATING ORAL EVERY 8 HOURS PRN
Qty: 30 TABLET | Refills: 1 | Status: SHIPPED | OUTPATIENT
Start: 2025-03-06

## 2025-03-06 NOTE — ASSESSMENT & PLAN NOTE
JOSEFINA finalized: 7/23/25 per LMP, 13-week US and ACOG    Optional testing NIPS,CF/SMA,AFP:  NIPS negative    COVID: declined  Tdap:  RSV:  Flu: declined    Rhogam: A positive  1hr Glucola:  FU RPR w glucola:  ? Desires Sterilization:    Anatomy US: 3/6 EFW 32%, AC 41%, cephalic, anterior grade 1 placenta, normal anatomy  FU US:    PROBLEM LIST/PLAN:   Hx of  preeclampsia - baseline labs normal, ASA 81 mg daily    Bicornuate uterus    UDS +THC at new OB    Rubella non-immune - plan vaccine after delivery

## 2025-03-24 ENCOUNTER — ROUTINE PRENATAL (OUTPATIENT)
Dept: OBSTETRICS AND GYNECOLOGY | Facility: CLINIC | Age: 30
End: 2025-03-24
Payer: COMMERCIAL

## 2025-03-24 VITALS — SYSTOLIC BLOOD PRESSURE: 106 MMHG | BODY MASS INDEX: 30.47 KG/M2 | WEIGHT: 172 LBS | DIASTOLIC BLOOD PRESSURE: 70 MMHG

## 2025-03-24 DIAGNOSIS — O09.299 HX OF PREECLAMPSIA, PRIOR PREGNANCY, CURRENTLY PREGNANT: ICD-10-CM

## 2025-03-24 DIAGNOSIS — O21.9 NAUSEA AND VOMITING DURING PREGNANCY: ICD-10-CM

## 2025-03-24 DIAGNOSIS — O23.40 URINARY TRACT INFECTION IN MOTHER DURING PREGNANCY, ANTEPARTUM: ICD-10-CM

## 2025-03-24 DIAGNOSIS — Z34.80 SUPERVISION OF OTHER NORMAL PREGNANCY, ANTEPARTUM: Primary | ICD-10-CM

## 2025-03-24 LAB
GLUCOSE UR STRIP-MCNC: NEGATIVE MG/DL
PROT UR STRIP-MCNC: NEGATIVE MG/DL

## 2025-03-24 PROCEDURE — 99213 OFFICE O/P EST LOW 20 MIN: CPT | Performed by: NURSE PRACTITIONER

## 2025-03-24 RX ORDER — ONDANSETRON 4 MG/1
4 TABLET, ORALLY DISINTEGRATING ORAL EVERY 8 HOURS PRN
Qty: 30 TABLET | Refills: 1 | Status: SHIPPED | OUTPATIENT
Start: 2025-03-24

## 2025-03-24 RX ORDER — AMOXICILLIN AND CLAVULANATE POTASSIUM 500; 125 MG/1; MG/1
TABLET, FILM COATED ORAL
COMMUNITY
Start: 2025-03-20

## 2025-03-24 RX ORDER — PROMETHAZINE HYDROCHLORIDE 25 MG/1
25 TABLET ORAL EVERY 6 HOURS PRN
Qty: 30 TABLET | Refills: 0 | Status: SHIPPED | OUTPATIENT
Start: 2025-03-24

## 2025-03-24 RX ORDER — CEFTRIAXONE 1 G/1
INJECTION, POWDER, FOR SOLUTION INTRAMUSCULAR; INTRAVENOUS
COMMUNITY
Start: 2025-03-20

## 2025-03-24 NOTE — ASSESSMENT & PLAN NOTE
Doing well, feeling much better  Still having headaches, will try adding promethazine  1hGTT/CBC next week

## 2025-03-24 NOTE — PROGRESS NOTES
OB FOLLOW UP      Chief Complaint   Patient presents with    Routine Prenatal Visit     Subjective:   Seen in [place] last week for fever, back pain.  Did have a fever, was treated with rocephin and augmentin. Starting to feel better, still having a headache.     Objective:  /70   Wt 78 kg (172 lb)   LMP 10/16/2024 (Exact Date)   BMI 30.47 kg/m²  -0.454 kg (-1 lb)  Uterine Size: size equals dates  FHT: 110-160 BPM    See OB flow for edema, cvx exam if performed, and Upro/Uglu    Assessment and Plan:  22w5d  Reassuring pregnancy progress.  Questions answered.  Diagnoses and all orders for this visit:    1. Supervision of other normal pregnancy, antepartum (Primary)  Overview:  JOSEFINA finalized: 7/23/25 per LMP, 13-week US and ACOG    Optional testing NIPS,CF/SMA,AFP:  NIPS negative    COVID: declined  Tdap:  RSV:  Flu: declined    Rhogam: A positive  1hr Glucola:  FU RPR w glucola:  ? Desires Sterilization:    Anatomy US: 3/6 EFW 32%, AC 41%, cephalic, anterior grade 1 placenta, normal anatomy  FU US:    PROBLEM LIST/PLAN:   Hx of  preeclampsia - baseline labs normal, ASA 81 mg daily    Bicornuate uterus    UDS +THC at new OB    Rubella non-immune - plan vaccine after delivery        Assessment & Plan:  Doing well, feeling much better  Still having headaches, will try adding promethazine  1hGTT/CBC next week      Orders:  -     POC Urinalysis Dipstick  -     promethazine (PHENERGAN) 25 MG tablet; Take 1 tablet by mouth Every 6 (Six) Hours As Needed for Nausea or Vomiting.  Dispense: 30 tablet; Refill: 0    2. Hx of preeclampsia, prior pregnancy, currently pregnant  Overview:  Baseline labs new OB- Normal 1/14/25  ASA 81mg Daily     Assessment & Plan:  BP normal today, is not taking ASA 81 mg, encouraged to start      3. Urinary tract infection in mother during pregnancy, antepartum  Overview:  3/20/25 - tx at urgent care in [place] with Rocephin/augmentin.  Feeling better      4. Nausea and vomiting  during pregnancy  -     ondansetron ODT (ZOFRAN-ODT) 4 MG disintegrating tablet; Place 1 tablet on the tongue Every 8 (Eight) Hours As Needed for Nausea or Vomiting.  Dispense: 30 tablet; Refill: 1      Counseling:    Second trimester precautions  Continue PNV.  Importance of healthy eating and staying active.    Return in about 10 days (around 4/3/2025) for OB follow up.          Celso Phillips, APRN  03/24/2025    [facility] [address]  [facility]  [address] [place] [address]  Dept: [phone]  Dept Fax: [phone]  Loc: [phone]

## 2025-04-01 NOTE — PROGRESS NOTES
OB FOLLOW UP        Chief Complaint   Patient presents with    Routine Prenatal Visit       Subjective:   Poison Ivy    Objective:  /66   Wt 78.9 kg (174 lb)   LMP 10/16/2024 (Exact Date)   BMI 30.82 kg/m²   Uterine Size: size equals dates  FHT: 110-160 BPM  Scattered rash noted across her face/hands consistent with contact dermatitis  See OB flow for LE edema, cvx exam if performed, and Upro/Uglu    Assessment and Plan:  23w6d  Reassuring pregnancy progress.  Questions answered.  Diagnoses and all orders for this visit:    1. Supervision of other normal pregnancy, antepartum (Primary)  Overview:  JOSEFINA finalized: 25 per LMP, 13-week US and ACOG    Optional testing NIPS,CF/SMA,AFP:  NIPS negative    COVID: declined  Tdap: prescription given 4/3/25  RSV:  Flu: declined    Rhogam: A positive  1hr Glucola:  FU RPR w glucola:  ? Desires Sterilization:    EPDS: 0 on 4/3/25    Anatomy US: 3/6 EFW 32%, AC 41%, cephalic, anterior grade 1 placenta, normal anatomy  FU US:    PROBLEM LIST/PLAN:   Hx of  preeclampsia - baseline labs normal, ASA 81 mg daily    Bicornuate uterus    UDS +THC at new OB    Rubella non-immune - plan vaccine after delivery        Assessment & Plan:  Doing well, has poison ivy all over, it is making her miserable  1hGTT, CBC, TPA today  Will send medrol dose pack/hydroxyzine  Tdap prescription given  Fetal kick counts   labor precautions    Orders:  -     POC Urinalysis Dipstick  -     Gestational Diabetes Screen 1 Hour  -     CBC (No Diff)  -     Treponema pallidum AB w/Reflex RPR    2. Hx of preeclampsia, prior pregnancy, currently pregnant  Overview:  Baseline labs new OB- Normal 25  ASA 81mg Daily       3. Urinary tract infection in mother during pregnancy, antepartum  Overview:  3/20/25 - tx at urgent care in [place] with Rocephin/augmentin.  Feeling better    Assessment & Plan:  Will send urine for follow up culture    Orders:  -     Urine Culture - , Urine, Clean  Catch    4. Contact dermatitis due to poison ivy  -     methylPREDNISolone (MEDROL) 4 MG dose pack; Take as directed on package instructions.  Dispense: 21 tablet; Refill: 0  -     hydrOXYzine (ATARAX) 25 MG tablet; Take 1 tablet by mouth 3 (Three) Times a Day As Needed for Itching.  Dispense: 30 tablet; Refill: 0      Counseling:    OB precautions, leaking, VB, papo urias vs PTL/Labor  FKC  Continue PNV.  Importance of healthy eating and exercise.    Return in about 4 weeks (around 5/1/2025) for OB follow up, Dr. Rankin.        Celso Phillips, APRN  04/03/2025    [facility] [address] [facility]  [address] [place] [address] [address] [address]  Dept: [phone]  Dept Fax: [phone]  Loc: [phone]

## 2025-04-03 ENCOUNTER — ROUTINE PRENATAL (OUTPATIENT)
Dept: OBSTETRICS AND GYNECOLOGY | Age: 30
End: 2025-04-03
Payer: COMMERCIAL

## 2025-04-03 VITALS — DIASTOLIC BLOOD PRESSURE: 66 MMHG | SYSTOLIC BLOOD PRESSURE: 104 MMHG | WEIGHT: 174 LBS | BODY MASS INDEX: 30.82 KG/M2

## 2025-04-03 DIAGNOSIS — O23.40 URINARY TRACT INFECTION IN MOTHER DURING PREGNANCY, ANTEPARTUM: ICD-10-CM

## 2025-04-03 DIAGNOSIS — L23.7 CONTACT DERMATITIS DUE TO POISON IVY: ICD-10-CM

## 2025-04-03 DIAGNOSIS — O09.299 HX OF PREECLAMPSIA, PRIOR PREGNANCY, CURRENTLY PREGNANT: ICD-10-CM

## 2025-04-03 DIAGNOSIS — Z34.80 SUPERVISION OF OTHER NORMAL PREGNANCY, ANTEPARTUM: Primary | ICD-10-CM

## 2025-04-03 LAB
DEPRECATED RDW RBC AUTO: 37.5 FL (ref 37–54)
ERYTHROCYTE [DISTWIDTH] IN BLOOD BY AUTOMATED COUNT: 12.7 % (ref 12.3–15.4)
GLUCOSE 1H P GLC SERPL-MCNC: 149 MG/DL (ref 65–139)
GLUCOSE UR STRIP-MCNC: NEGATIVE MG/DL
HCT VFR BLD AUTO: 29.6 % (ref 34–46.6)
HGB BLD-MCNC: 9.7 G/DL (ref 12–15.9)
MCH RBC QN AUTO: 26.7 PG (ref 26.6–33)
MCHC RBC AUTO-ENTMCNC: 32.8 G/DL (ref 31.5–35.7)
MCV RBC AUTO: 81.5 FL (ref 79–97)
PLATELET # BLD AUTO: 272 10*3/MM3 (ref 140–450)
PMV BLD AUTO: 13 FL (ref 6–12)
PROT UR STRIP-MCNC: NEGATIVE MG/DL
RBC # BLD AUTO: 3.63 10*6/MM3 (ref 3.77–5.28)
TREPONEMA PALLIDUM IGG+IGM AB [PRESENCE] IN SERUM OR PLASMA BY IMMUNOASSAY: NORMAL
WBC NRBC COR # BLD AUTO: 9.3 10*3/MM3 (ref 3.4–10.8)

## 2025-04-03 PROCEDURE — 86780 TREPONEMA PALLIDUM: CPT | Performed by: NURSE PRACTITIONER

## 2025-04-03 PROCEDURE — 85027 COMPLETE CBC AUTOMATED: CPT | Performed by: NURSE PRACTITIONER

## 2025-04-03 PROCEDURE — 82950 GLUCOSE TEST: CPT | Performed by: NURSE PRACTITIONER

## 2025-04-03 PROCEDURE — 87086 URINE CULTURE/COLONY COUNT: CPT | Performed by: NURSE PRACTITIONER

## 2025-04-03 RX ORDER — METHYLPREDNISOLONE 4 MG/1
TABLET ORAL
Qty: 21 TABLET | Refills: 0 | Status: SHIPPED | OUTPATIENT
Start: 2025-04-03

## 2025-04-03 RX ORDER — HYDROXYZINE HYDROCHLORIDE 25 MG/1
25 TABLET, FILM COATED ORAL 3 TIMES DAILY PRN
Qty: 30 TABLET | Refills: 0 | Status: SHIPPED | OUTPATIENT
Start: 2025-04-03

## 2025-04-03 NOTE — ASSESSMENT & PLAN NOTE
Doing well, has poison ivy all over, it is making her miserable  1hGTT, CBC, TPA today  Will send medrol dose pack/hydroxyzine  Tdap prescription given  Fetal kick counts   labor precautions

## 2025-04-03 NOTE — PROGRESS NOTES
Venipuncture performed in left arm (site) by AF (employee) with good hemostasis. Patient tolerated well. Date 04.03.25.AF

## 2025-04-04 PROBLEM — O99.019 MATERNAL ANEMIA IN PREGNANCY, ANTEPARTUM: Status: ACTIVE | Noted: 2025-04-04

## 2025-04-04 PROBLEM — O99.810 ABNORMAL GLUCOSE TOLERANCE TEST (GTT) DURING PREGNANCY, ANTEPARTUM: Status: ACTIVE | Noted: 2025-04-04

## 2025-04-05 LAB — BACTERIA SPEC AEROBE CULT: NORMAL

## 2025-04-10 ENCOUNTER — LAB (OUTPATIENT)
Facility: HOSPITAL | Age: 30
End: 2025-04-10
Payer: COMMERCIAL

## 2025-04-10 ENCOUNTER — TELEPHONE (OUTPATIENT)
Dept: OBSTETRICS AND GYNECOLOGY | Age: 30
End: 2025-04-10
Payer: COMMERCIAL

## 2025-04-10 DIAGNOSIS — O99.810 ABNORMAL GLUCOSE TOLERANCE TEST (GTT) DURING PREGNANCY, ANTEPARTUM: ICD-10-CM

## 2025-04-10 NOTE — TELEPHONE ENCOUNTER
[facility] lab called voiced patient came in for 3 hour then refused to do the 3 hour due to the glucose drink makes her sick. I have contacted patient left message to call office back to discuss.

## 2025-04-30 NOTE — PROGRESS NOTES
Routine Prenatal Visit     Subjective  Maylin Abraham is a 30 y.o.  at 28w1d here for her routine OB visit.   She is taking her prenatal vitamins.Reports no loss of fluid or vaginal bleeding. Patient doing well.     Pregnancy is complicated by:     Patient Active Problem List   Diagnosis    Supervision of other normal pregnancy, antepartum    Hx of preeclampsia, prior pregnancy, currently pregnant    Tobacco abuse    Bicornuate uterus affecting pregnancy, antepartum    Positive urine drug screen    Rubella non-immune status, antepartum    Urinary tract infection in mother during pregnancy, antepartum    Abnormal glucose tolerance test (GTT) during pregnancy, antepartum    Maternal anemia in pregnancy, antepartum         OB History    Para Term  AB Living   4 2 2  1 2   SAB IAB Ectopic Molar Multiple Live Births   1    0 2      # Outcome Date GA Lbr Jeremiah/2nd Weight Sex Type Anes PTL Lv   4 Current            3 Term 24 39w0d 08: 00:14 3190 g (7 lb 0.5 oz) F Vag-Spont EPI N NOLAN   2 SAB 23     SAB      1 Term 20 38w0d  2722 g (6 lb) M Vag-Spont   NOLAN           ROS:    General ROS: negative for - chills or fatigue  Genito-Urinary ROS: negative for  change in urinary stream, vaginal discharge     Objective  Physical Exam:    Vitals:    25 1026   BP: 106/68       Uterine Size: size equals dates  FHT: 110-160 BPM         Assessment/Plan:   Diagnoses and all orders for this visit:    1. Supervision of other normal pregnancy, antepartum (Primary)  -     POC Urinalysis Dipstick    2. Abnormal glucose tolerance test (GTT) during pregnancy, antepartum  Assessment & Plan:  Failed 1hr gtt  Does not want to do 3hr gtt. Would prefer to check BG 4x daily for 2 weeks. Supplies sent today and logs given    Orders:  -     glucose monitor monitoring kit; Check BS FOUR times per day (fasting and 2hrs after meals) and record.  Bring blood sugar record to next office visit.  Dispense: 1  each; Refill: 0  -     glucose blood test strip; Check BS 4x/day as instructed.  Dispense: 120 each; Refill: 1  -     Lancets misc; Use 120 each 4 (Four) Times a Day. Check blood sugars four times daily as directed  Dispense: 120 each; Refill: 3  -     Alcohol Swabs pads; Use to clean fingers before checking BS 4 times per day and PRN  Dispense: 120 each; Refill: 3    3. Hx of preeclampsia, prior pregnancy, currently pregnant    4. Maternal anemia in pregnancy, antepartum          Counseling:   OB precautions, leaking, VB, papo urias vs PTL/Labor  FKC  Round Ligament Pain:  The uterus has several ligaments which provide support and keep the uterus in place. As the  uterus grows these ligaments are pulled and stretched which often causes sharp stabbing like pain in the inguinal area.   You may find a pregnancy support band helpful. Changing positions may also help. Yoga is a great way to cope with round ligament and low back pain in pregnancy.    Massage may also help with low back pain   Things to Consider at this Point in your Pregnancy:  Some women experience swelling in their feet during pregnancy. Compression stockings may help  Drink plenty of water and stay active   Make sure you are eating frequent small meals, nuts are a wonderful snack to keep with you            Return in about 2 weeks (around 5/15/2025) for Routine OB visit.      We have gone over prenatal care to include the timing and content of visits. I informed her how to contact the office and/or on call person in the event of any problems and encouraged her to do so when she feels it is necessary.  We then spent time answering her questions which she indicated were answered to her satisfaction.    Marcy Rankin DO  5/1/2025 16:04 EDT

## 2025-05-01 ENCOUNTER — PATIENT OUTREACH (OUTPATIENT)
Dept: LABOR AND DELIVERY | Facility: HOSPITAL | Age: 30
End: 2025-05-01
Payer: COMMERCIAL

## 2025-05-01 ENCOUNTER — ROUTINE PRENATAL (OUTPATIENT)
Dept: OBSTETRICS AND GYNECOLOGY | Age: 30
End: 2025-05-01
Payer: COMMERCIAL

## 2025-05-01 VITALS — SYSTOLIC BLOOD PRESSURE: 106 MMHG | DIASTOLIC BLOOD PRESSURE: 68 MMHG | BODY MASS INDEX: 30.82 KG/M2 | WEIGHT: 174 LBS

## 2025-05-01 DIAGNOSIS — Z34.80 SUPERVISION OF OTHER NORMAL PREGNANCY, ANTEPARTUM: Primary | ICD-10-CM

## 2025-05-01 DIAGNOSIS — O99.810 ABNORMAL GLUCOSE TOLERANCE TEST (GTT) DURING PREGNANCY, ANTEPARTUM: ICD-10-CM

## 2025-05-01 DIAGNOSIS — O99.019 MATERNAL ANEMIA IN PREGNANCY, ANTEPARTUM: ICD-10-CM

## 2025-05-01 DIAGNOSIS — O09.299 HX OF PREECLAMPSIA, PRIOR PREGNANCY, CURRENTLY PREGNANT: ICD-10-CM

## 2025-05-01 LAB
GLUCOSE UR STRIP-MCNC: NEGATIVE MG/DL
PROT UR STRIP-MCNC: NEGATIVE MG/DL

## 2025-05-01 RX ORDER — BLOOD PRESSURE TEST KIT
KIT MISCELLANEOUS
Qty: 120 EACH | Refills: 3 | Status: SHIPPED | OUTPATIENT
Start: 2025-05-01

## 2025-05-01 RX ORDER — BLOOD-GLUCOSE METER
KIT MISCELLANEOUS
Qty: 1 EACH | Refills: 0 | Status: SHIPPED | OUTPATIENT
Start: 2025-05-01

## 2025-05-01 RX ORDER — AVOBENZONE, HOMOSALATE, OCTISALATE, OCTOCRYLENE 30; 40; 45; 26 MG/ML; MG/ML; MG/ML; MG/ML
120 CREAM TOPICAL 4 TIMES DAILY
Qty: 120 EACH | Refills: 3 | Status: SHIPPED | OUTPATIENT
Start: 2025-05-01

## 2025-05-01 NOTE — OUTREACH NOTE
[facility]  Enrollment    Current Estimated Gestational Age: 28w1d    Questions/Answers      Flowsheet Row Responses   Would like to participate? Yes        [facility]  Intake    Current Estimated Gestational Age: 28w1d    Intake Assessment      Flowsheet Row Responses   Best Method for Contacting Cell   Currently Employed Yes   Able to keep appointments as scheduled Yes   Gender(s) and Name(s) Girl   Resources Presently Utilizing: WIC (Women, Infant, Children)   Maternal Warning Signs Provided   Other: Provided   Other Education WIC Benefits, Insurance benefits/Incentives, HANDS            Learning Assessment      Flowsheet Row Responses   Relationship Patient, Significant Other   Does the learner have any barriers to learning? No Barriers   What is the preferred language of the learner for medical teaching? English   Is an  required? No            Pediatrician:   FOB:   Feeding Plan:     No current concerns with food, housing or transportation.  Encouraged to reach out for any questions, needs or concerns.    Tobacco, Alcohol, and Drug History     reports that she has quit smoking. Her smoking use included cigarettes. She started smoking about 10 years ago. She has a 5.4 pack-year smoking history. She has been exposed to tobacco smoke. She has never used smokeless tobacco.   reports that she does not currently use alcohol.   reports no history of drug use.    Sydnee Petit RN  Maternity Nurse Navigator    5/1/2025, 10:45 EDT

## 2025-05-01 NOTE — ASSESSMENT & PLAN NOTE
Failed 1hr gtt  Does not want to do 3hr gtt. Would prefer to check BG 4x daily for 2 weeks. Supplies sent today and logs given

## 2025-05-07 NOTE — PROGRESS NOTES
OB FOLLOW UP        Chief Complaint   Patient presents with    Routine Prenatal Visit       Subjective:   No complaints.  Denies VB, leaking fluid, current regular cramping or contractions.    Objective:  /62   Wt 79.2 kg (174 lb 9.6 oz)   LMP 10/16/2024 (Exact Date)   BMI 30.93 kg/m²   Uterine Size: size equals dates  FHT: 110-160 BPM    See OB flow for LE edema, cvx exam if performed, and Upro/Uglu    Assessment and Plan:  29w0d  Reassuring pregnancy progress.  Questions answered.  Diagnoses and all orders for this visit:    1. Supervision of other normal pregnancy, antepartum (Primary)  Overview:  JOSEFINA finalized: 25 per LMP, 13-week US and ACOG    Optional testing NIPS,CF/SMA,AFP:  NIPS negative    COVID: declined  Tdap: declined 25  RSV:  Flu: declined    Rhogam: A positive  1hr Glucola:  FU RPR w glucola:  ? Desires Sterilization:    EPDS: 0 on 4/3/25    Anatomy US: 3/6 EFW 32%, AC 41%, cephalic, anterior grade 1 placenta, normal anatomy  FU US:    PROBLEM LIST/PLAN:   Hx of  preeclampsia - baseline labs normal, ASA 81 mg daily    Bicornuate uterus    UDS +THC at new OB    Rubella non-immune - plan vaccine after delivery        Assessment & Plan:  Doing well, no complaints  Reviewed glucose logs  Declines Tdap vaccine  Fetal kick counts   labor precautions    Orders:  -     POC Urinalysis Dipstick  -     US Ob 14 + Weeks Single or First Gestation; Future    2. Abnormal glucose tolerance test (GTT) during pregnancy, antepartum  Assessment & Plan:  Reviewed blood glucose logs, fasting levels are all normal.  Postprandial levels range from , patient knows which foods cause an elevation in her levels.  Recommend she continue to monitor for the next two weeks and work on tweaking her nutrition.  Will plan ultrasound for growth at next visit.    Orders:  -     US Ob 14 + Weeks Single or First Gestation; Future      Counseling:    OB precautions, leaking, VB, papo urias vs  PTL/Labor  FKC  Continue PNV.  Importance of healthy eating and exercise.    Return in about 2 weeks (around 5/27/2025) for OB follow up, Dr. Rankin, growth ultrasound.        Celso Phillips, APRN  05/13/2025    [facility] [address]  [facility]  [facility] [place] [address]  Dept: [phone]  Dept Fax: [phone]  Loc: [phone]

## 2025-05-09 ENCOUNTER — PATIENT OUTREACH (OUTPATIENT)
Dept: LABOR AND DELIVERY | Facility: HOSPITAL | Age: 30
End: 2025-05-09
Payer: COMMERCIAL

## 2025-05-13 ENCOUNTER — ROUTINE PRENATAL (OUTPATIENT)
Dept: OBSTETRICS AND GYNECOLOGY | Age: 30
End: 2025-05-13
Payer: COMMERCIAL

## 2025-05-13 VITALS — BODY MASS INDEX: 30.93 KG/M2 | DIASTOLIC BLOOD PRESSURE: 62 MMHG | SYSTOLIC BLOOD PRESSURE: 103 MMHG | WEIGHT: 174.6 LBS

## 2025-05-13 DIAGNOSIS — Z34.80 SUPERVISION OF OTHER NORMAL PREGNANCY, ANTEPARTUM: Primary | ICD-10-CM

## 2025-05-13 DIAGNOSIS — O99.810 ABNORMAL GLUCOSE TOLERANCE TEST (GTT) DURING PREGNANCY, ANTEPARTUM: ICD-10-CM

## 2025-05-13 LAB
GLUCOSE UR STRIP-MCNC: NEGATIVE MG/DL
PROT UR STRIP-MCNC: NEGATIVE MG/DL

## 2025-05-13 NOTE — ASSESSMENT & PLAN NOTE
Doing well, no complaints  Reviewed glucose logs  Declines Tdap vaccine  Fetal kick counts   labor precautions

## 2025-05-13 NOTE — ASSESSMENT & PLAN NOTE
Reviewed blood glucose logs, fasting levels are all normal.  Postprandial levels range from , patient knows which foods cause an elevation in her levels.  Recommend she continue to monitor for the next two weeks and work on tweaking her nutrition.  Will plan ultrasound for growth at next visit.

## 2025-05-21 ENCOUNTER — TELEPHONE (OUTPATIENT)
Dept: OBSTETRICS AND GYNECOLOGY | Age: 30
End: 2025-05-21
Payer: COMMERCIAL

## 2025-05-28 NOTE — PROGRESS NOTES
OB FOLLOW UP      Chief Complaint   Patient presents with    Routine Prenatal Visit     Subjective:   New patient to me    Pt not seen in office, I had to proceed immediately to [facility] for a delivery.  However I did do telephone visit and spent approx 25min w pt. I was at [facility].  Patient was on her mobile cell phone and not at the facility.    Last office visit was seen by DEV Phillips and had some blood sugars that were abnormal.  Patient states she has not been checking her blood sugars anymore.      No complaints.  Denies VB, leaking fluid, current regular cramping or contractions.    Objective:  /72   Wt 81.6 kg (180 lb)   LMP 10/16/2024 (Exact Date)   BMI 31.89 kg/m²  3.175 kg (7 lb) Body mass index is 31.89 kg/m².  See OB flow sheet for fundal height (not performed if US day of OV), FHT, edema, cvx exam if performed, and Upro/Uglu.       Assessment and Plan:  32w1d     Diagnoses and all orders for this visit:    1. Supervision of other normal pregnancy, antepartum (Primary)  Overview:  JOSEFINA finalized: 7/23/25 per LMP, 13-week US and ACOG    NIPS negative, XX.  Unsure if CF done previously 5/29/2025 messaged KF to check EPW records w G1    COVID: Recommended, declined  Tdap: Recommended, declined  Flu: Recommended, declined    Rhogam: A positive  1hr Glucola: 149  ? Desires Sterilization: Declines 5/29/2025     EPDS: 0 on 4/3/25    Anatomy US: 3/6 EFW 32%, AC 41%, cephalic, anterior grade 1 placenta, normal anatomy  FU US: 5/29 [facility] EFW 4 pounds 5 ounces, 46%.  AC 55%, JANAE 14.9, vertex, grade 1 placenta    PROBLEM LIST/PLAN:   Elev 1hr PG- declined 3hr, checking BS  Hx of  preeclampsia - baseline labs normal, UPC 0.15, ASA 81 mg daily- continue  Pyelo in preg  Bicornuate uterus  UDS +THC at new OB  Rubella non-immune - plan vaccine after delivery  Anemia  Rec preg loss  MAHESH III    Orders:  -     POC Urinalysis Dipstick    2. Abnormal glucose tolerance test (GTT) during pregnancy,  antepartum  Overview:  5/29/2025 has stopped checking blood sugars despite need to continue.  Fastings 80-90 by report and 2 hours postprandial up to 132.  I strongly recommend she continue to check blood sugars 4 times a day, monitor her diet with decreasing carbs and fats increasing protein and veggies and increasing her activity.  She was counseled regarding risks in pregnancy associated with undiagnosed/on treated gestational diabetes.      3. Maternal anemia in pregnancy, antepartum  Overview:  Iron QOD, importance    Assessment & Plan:  Not taking iron as prescribed, recommend restart.  CBC next office visit      4. Marijuana use during pregnancy  Overview:  Positive THC at new OB  5/29/2025 patient states she has since quit marijuana it helps her significantly with nausea and anxiety.  Declines any other medications.  Was counseled regarding risks of THC in pregnancy.      5. Severe dysplasia of cervix (MAHESH III)  Overview:  2023 LG/ASC rule out high-grade, HPV positive during pregnancy  May 2024, postpartum colposcopy, ECC MAHESH-3.  No follow-up  February 2025 during pregnancy Pap negative, HPV negative    5/29/2025 rec colpo and ECC PP vs LEEP PP      6. Recurrent pregnancy loss  Overview:  10+SAB by report, at least one 10+weeks GA by US  5/29/2025 discussed option of evaluation now versus workup postpartum.  Patient desires workup postpartum      7. Bicornuate uterus affecting pregnancy, antepartum  Overview:  5/29/2025 US 2023 showed possible bicornuate uterus, hx of recurrent losses, consider imaging/eval PP if desires more children      8. Acute pyelonephritis  Overview:  Care everywhere mar 2025  5/29/2025 recommend urine culture next OV        Counseling:    OB precautions, leaking, VB, papo urias vs PTL/Labor  FKC  GESTATIONAL DIABETES discussed.  Importance of strict BS control (goals <95F & <120 2hr PP).  Record and bring all BS to qOV (provided log).  Risks of inadequate control NLT macrosomia,  shoulder dystocia +/- perm nn damage/fractures/O2 deprivation/brain damage, maternal lacs/dyspareunia, incontinence, prolapse, CS, hemorrhage/transfusion, childhood obesity/DM.  Appropriate diet choices, consistency with eating/sleep schedules, & exercising recommended.  Lifetime risk DM and need for PP and yearly glucose checks.  Message me or our office  on MyChart or call if >25% of BS out of normal range before next OV to discuss treatment.  MARIJUANA use discussed.  THC crosses the placenta and is excreted in breast milk.  Contains respiratory disease causing carcinogens like tobacco smoke, but at much higher concentrations.  Higher risk of adverse fetal outcomes not limited to PTD, lower birthweight, higher risk of birth defects, C-sections, GDM, GHTN/pre-e, and possibly higher risk for stillbirth.  >90% increase autism spectrum disorder and ADHD.  Increased behavioral problems, decreased attention span, intellectual and learning disabilities as it disrupts normal brain development of fetus.  Increases risk of sensitivity of the child in the future to drugs of abuse.  I recommend she avoid using and any second hand smoke.       Reassuring pregnancy progress. Continue PNV.      Return in about 2 weeks (around 6/12/2025) for FU OB q2wks to 36weeks then weekly to JOSEFINA.    I spent 25 minutes caring for Maylin on this date of service. This time includes time spent by me in the following activities:preparing for the visit, reviewing tests, obtaining and/or reviewing a separately obtained history, counseling and educating the patient/family/caregiver, ordering medications, tests, or procedures, documenting information in the medical record, and  that was separate from reading the ultrasound        Soo Rodríguez DO  05/29/2025    [facility] [address] [facility]  [address] [place] [address] [address] [place] [place] [address]  Dept: [phone]  Dept Fax: [phone]  Loc: [phone]  Loc Fax: [phone]

## 2025-05-29 ENCOUNTER — ROUTINE PRENATAL (OUTPATIENT)
Dept: OBSTETRICS AND GYNECOLOGY | Age: 30
End: 2025-05-29
Payer: COMMERCIAL

## 2025-05-29 VITALS — DIASTOLIC BLOOD PRESSURE: 72 MMHG | SYSTOLIC BLOOD PRESSURE: 109 MMHG | WEIGHT: 180 LBS | BODY MASS INDEX: 31.89 KG/M2

## 2025-05-29 DIAGNOSIS — O99.019 MATERNAL ANEMIA IN PREGNANCY, ANTEPARTUM: ICD-10-CM

## 2025-05-29 DIAGNOSIS — D06.9 SEVERE DYSPLASIA OF CERVIX (CIN III): ICD-10-CM

## 2025-05-29 DIAGNOSIS — O99.810 ABNORMAL GLUCOSE TOLERANCE TEST (GTT) DURING PREGNANCY, ANTEPARTUM: ICD-10-CM

## 2025-05-29 DIAGNOSIS — Q51.3 BICORNUATE UTERUS AFFECTING PREGNANCY, ANTEPARTUM: ICD-10-CM

## 2025-05-29 DIAGNOSIS — N10 ACUTE PYELONEPHRITIS: ICD-10-CM

## 2025-05-29 DIAGNOSIS — Z34.80 SUPERVISION OF OTHER NORMAL PREGNANCY, ANTEPARTUM: Primary | ICD-10-CM

## 2025-05-29 DIAGNOSIS — O99.320 MARIJUANA USE DURING PREGNANCY: ICD-10-CM

## 2025-05-29 DIAGNOSIS — O34.00 BICORNUATE UTERUS AFFECTING PREGNANCY, ANTEPARTUM: ICD-10-CM

## 2025-05-29 DIAGNOSIS — F12.90 MARIJUANA USE DURING PREGNANCY: ICD-10-CM

## 2025-05-29 DIAGNOSIS — N96 RECURRENT PREGNANCY LOSS: ICD-10-CM

## 2025-05-29 LAB
GLUCOSE UR STRIP-MCNC: NEGATIVE MG/DL
PROT UR STRIP-MCNC: NEGATIVE MG/DL

## 2025-05-29 RX ORDER — FERROUS SULFATE 325(65) MG
325 TABLET ORAL EVERY OTHER DAY
Qty: 15 TABLET | Refills: 3 | Status: CANCELLED | OUTPATIENT
Start: 2025-05-29

## 2025-06-02 ENCOUNTER — TELEPHONE (OUTPATIENT)
Dept: OBSTETRICS AND GYNECOLOGY | Age: 30
End: 2025-06-02

## 2025-06-02 NOTE — TELEPHONE ENCOUNTER
Hub staff attempted to follow warm transfer process and was unsuccessful     Caller: Maylin Abraham    Relationship to patient: Self    Best call back number:     Patient is needing: TO FIND OUT WHY AN APPT WAS SCHEDULED FOR HER ON 6/2/25 AND MARKED NO SHOW - PT WAS SEEN IN THE OFFICE FOR U/S THEN AFTER WAS SUPPOSED TO SEE DR INTERIANO BUT SHE HAD AN EMERGENCY OR DELIVERY AND HAD TO LEAVE - PT LATER HAD A TELEHEALTH VISIT W/ DR INTERIANO LATER THAT EVENING AND DID NOT NEED TO BE SEEN AGAIN UNTIL 6/12/25 - PT JUST WANTS TO MAKE SURE SHE DIDN'T MISS AN APPT - ALSO PT IS ALSO SCHEDULED ON 6/12/25 AND NEEDS TO KNOW IF THAT APPT NEEDS TO BE RESCHEDULED OR NOT AND IF SO PT WOULD NEED TO KEEP AN AFTERNOON APPT PREFERABLY PLEASE CALL

## 2025-06-04 ENCOUNTER — TELEPHONE (OUTPATIENT)
Dept: OBSTETRICS AND GYNECOLOGY | Age: 30
End: 2025-06-04
Payer: COMMERCIAL

## 2025-06-04 NOTE — TELEPHONE ENCOUNTER
Left voicemail. Attempted to reach patient in regards to an appointment on 07/03 with Dr. Rivero. She is going to be out of the office that afternoon and that appointment is needing rescheduled. Please reschedule patient if she calls in.    HUB TO RELAY

## 2025-06-11 NOTE — ASSESSMENT & PLAN NOTE
- I recommend starting a daily fiber supplement with Citrucel or Fibercon (can purchase at your local pharmacy)  - I recommend that you also use Miralax 17 grams daily-to-twice daily as needed to have a regular, soft BM without straining you can adjust how often you need miralax, but start with daily dosing and go from there  - I recommend drinking at least 60-80 ounces of water daily unless you have a medical condition that requires fluid restriction     US on 8/10/23 showed possible bicornuate uterus

## 2025-06-12 ENCOUNTER — ROUTINE PRENATAL (OUTPATIENT)
Dept: OBSTETRICS AND GYNECOLOGY | Age: 30
End: 2025-06-12
Payer: COMMERCIAL

## 2025-06-12 VITALS — WEIGHT: 181 LBS | BODY MASS INDEX: 32.06 KG/M2 | SYSTOLIC BLOOD PRESSURE: 105 MMHG | DIASTOLIC BLOOD PRESSURE: 70 MMHG

## 2025-06-12 DIAGNOSIS — Z34.80 SUPERVISION OF OTHER NORMAL PREGNANCY, ANTEPARTUM: Primary | ICD-10-CM

## 2025-06-12 DIAGNOSIS — O09.899 RUBELLA NON-IMMUNE STATUS, ANTEPARTUM: ICD-10-CM

## 2025-06-12 DIAGNOSIS — O09.299 HX OF PREECLAMPSIA, PRIOR PREGNANCY, CURRENTLY PREGNANT: ICD-10-CM

## 2025-06-12 DIAGNOSIS — Z28.39 RUBELLA NON-IMMUNE STATUS, ANTEPARTUM: ICD-10-CM

## 2025-06-12 DIAGNOSIS — O99.019 MATERNAL ANEMIA IN PREGNANCY, ANTEPARTUM: ICD-10-CM

## 2025-06-12 LAB
GLUCOSE UR STRIP-MCNC: NEGATIVE MG/DL
PROT UR STRIP-MCNC: NEGATIVE MG/DL

## 2025-06-12 NOTE — PROGRESS NOTES
Routine Prenatal Visit     Subjective  Maylin Abraham is a 30 y.o.  at 34w1d here for her routine OB visit.   She is taking her prenatal vitamins.Reports no loss of fluid or vaginal bleeding. Patient doing well.     Pregnancy complicated by:     Patient Active Problem List   Diagnosis    Supervision of other normal pregnancy, antepartum    Hx of preeclampsia, prior pregnancy, currently pregnant    Tobacco abuse    Bicornuate uterus affecting pregnancy, antepartum    Marijuana use during pregnancy    Rubella non-immune status, antepartum    Abnormal glucose tolerance test (GTT) during pregnancy, antepartum    Maternal anemia in pregnancy, antepartum    Severe dysplasia of cervix (MAHESH III)    Recurrent pregnancy loss    Acute pyelonephritis         OB History    Para Term  AB Living   5 2 2  2 2   SAB IAB Ectopic Molar Multiple Live Births   2    0 2      # Outcome Date GA Lbr Jeremiah/2nd Weight Sex Type Anes PTL Lv   5 Current            4 Term 24 39w0d 08:29 / 00:14 3190 g (7 lb 0.5 oz) F Vag-Spont EPI N NOLAN   3 SAB 23 5w0d    SAB      2 Term 20 38w0d  2722 g (6 lb) M Vag-Spont   NOLAN   1 SAB  10w0d    SAB         Birth Comments: not sure      Obstetric Comments   2025 pt states had multiple losses up to 10.  Never had workup.            ROS:  General ROS: negative for - chills or fatigue  Genito-Urinary ROS: negative for  change in urinary stream, vaginal discharge     Objective  Physical Exam:   Vitals:    25 1424   BP: 105/70         FHT: 110-160 BPM         Assessment/Plan:   Diagnoses and all orders for this visit:    1. Supervision of other normal pregnancy, antepartum (Primary)  -     POC Urinalysis Dipstick  -     US Ob 14 + Weeks Single or First Gestation; Future    2. Maternal anemia in pregnancy, antepartum    3. Rubella non-immune status, antepartum    4. Hx of preeclampsia, prior pregnancy, currently pregnant            Counseling:   OB precautions,  leaking, VB, papo urias vs PTL/Labor  FKC  Round Ligament Pain:  The uterus has several ligaments which provide support and keep the uterus in place. As the  uterus grows these ligaments are pulled and stretched which often causes sharp stabbing like pain in the inguinal area.   You may find a pregnancy support band helpful. Changing positions may also help. Yoga is a great way to cope with round ligament and low back pain in pregnancy.    Massage may also help with low back pain   Things to Consider at this Point in your Pregnancy:  Some women experience swelling in their feet during pregnancy. Compression stockings may help  Drink plenty of water and stay active   Make sure you are eating frequent small meals, nuts are a wonderful snack to keep with you            Return in about 2 weeks (around 6/26/2025) for Growth US, Routine OB visit.      We have gone over prenatal care to include the timing and content of visits. I informed her how to contact the office and/or on call person in the event of any problems and encouraged her to do so when she feels it is necessary.  We then spent time answering her questions which she indicated were answered to her satisfaction.    Marcy Rankin DO  6/12/2025 14:36 EDT

## 2025-06-26 ENCOUNTER — ROUTINE PRENATAL (OUTPATIENT)
Dept: OBSTETRICS AND GYNECOLOGY | Age: 30
End: 2025-06-26
Payer: COMMERCIAL

## 2025-06-26 VITALS — WEIGHT: 183 LBS | BODY MASS INDEX: 32.42 KG/M2 | SYSTOLIC BLOOD PRESSURE: 113 MMHG | DIASTOLIC BLOOD PRESSURE: 69 MMHG

## 2025-06-26 DIAGNOSIS — O09.899 RUBELLA NON-IMMUNE STATUS, ANTEPARTUM: ICD-10-CM

## 2025-06-26 DIAGNOSIS — O34.00 BICORNUATE UTERUS AFFECTING PREGNANCY, ANTEPARTUM: ICD-10-CM

## 2025-06-26 DIAGNOSIS — Z3A.36 36 WEEKS GESTATION OF PREGNANCY: Primary | ICD-10-CM

## 2025-06-26 DIAGNOSIS — O99.019 MATERNAL ANEMIA IN PREGNANCY, ANTEPARTUM: ICD-10-CM

## 2025-06-26 DIAGNOSIS — O99.810 ABNORMAL GLUCOSE TOLERANCE TEST (GTT) DURING PREGNANCY, ANTEPARTUM: ICD-10-CM

## 2025-06-26 DIAGNOSIS — O09.299 HX OF PREECLAMPSIA, PRIOR PREGNANCY, CURRENTLY PREGNANT: ICD-10-CM

## 2025-06-26 DIAGNOSIS — Q51.3 BICORNUATE UTERUS AFFECTING PREGNANCY, ANTEPARTUM: ICD-10-CM

## 2025-06-26 DIAGNOSIS — Z28.39 RUBELLA NON-IMMUNE STATUS, ANTEPARTUM: ICD-10-CM

## 2025-06-26 LAB
ALBUMIN SERPL-MCNC: 3.6 G/DL (ref 3.5–5.2)
ALBUMIN/GLOB SERPL: 1.1 G/DL
ALP SERPL-CCNC: 124 U/L (ref 39–117)
ALT SERPL W P-5'-P-CCNC: 7 U/L (ref 1–33)
ANION GAP SERPL CALCULATED.3IONS-SCNC: 11.4 MMOL/L (ref 5–15)
AST SERPL-CCNC: 17 U/L (ref 1–32)
BILIRUB SERPL-MCNC: 0.3 MG/DL (ref 0–1.2)
BUN SERPL-MCNC: 8 MG/DL (ref 6–20)
BUN/CREAT SERPL: 18.2 (ref 7–25)
CALCIUM SPEC-SCNC: 9.6 MG/DL (ref 8.6–10.5)
CHLORIDE SERPL-SCNC: 104 MMOL/L (ref 98–107)
CO2 SERPL-SCNC: 21.6 MMOL/L (ref 22–29)
CREAT SERPL-MCNC: 0.44 MG/DL (ref 0.57–1)
DEPRECATED RDW RBC AUTO: 37.3 FL (ref 37–54)
EGFRCR SERPLBLD CKD-EPI 2021: 135.3 ML/MIN/1.73
ERYTHROCYTE [DISTWIDTH] IN BLOOD BY AUTOMATED COUNT: 13.1 % (ref 12.3–15.4)
GLOBULIN UR ELPH-MCNC: 3.4 GM/DL
GLUCOSE SERPL-MCNC: 73 MG/DL (ref 65–99)
GLUCOSE UR STRIP-MCNC: NEGATIVE MG/DL
HCT VFR BLD AUTO: 27.6 % (ref 34–46.6)
HGB BLD-MCNC: 8.7 G/DL (ref 12–15.9)
MCH RBC QN AUTO: 24.8 PG (ref 26.6–33)
MCHC RBC AUTO-ENTMCNC: 31.5 G/DL (ref 31.5–35.7)
MCV RBC AUTO: 78.6 FL (ref 79–97)
PLATELET # BLD AUTO: 237 10*3/MM3 (ref 140–450)
PMV BLD AUTO: 13.5 FL (ref 6–12)
POTASSIUM SERPL-SCNC: 4.2 MMOL/L (ref 3.5–5.2)
PROT SERPL-MCNC: 7 G/DL (ref 6–8.5)
PROT UR STRIP-MCNC: NEGATIVE MG/DL
RBC # BLD AUTO: 3.51 10*6/MM3 (ref 3.77–5.28)
SODIUM SERPL-SCNC: 137 MMOL/L (ref 136–145)
WBC NRBC COR # BLD AUTO: 13.64 10*3/MM3 (ref 3.4–10.8)

## 2025-06-26 PROCEDURE — 80053 COMPREHEN METABOLIC PANEL: CPT | Performed by: OBSTETRICS & GYNECOLOGY

## 2025-06-26 PROCEDURE — 87653 STREP B DNA AMP PROBE: CPT | Performed by: OBSTETRICS & GYNECOLOGY

## 2025-06-26 PROCEDURE — 85027 COMPLETE CBC AUTOMATED: CPT | Performed by: OBSTETRICS & GYNECOLOGY

## 2025-06-26 NOTE — PROGRESS NOTES
Routine Prenatal Visit     Subjective  Maylin Abraham is a 30 y.o.  at 36w1d here for her routine OB visit.   She is taking her prenatal vitamins.Reports no loss of fluid or vaginal bleeding. Patient doing well without any complaints. Pregnancy complicated by:     Patient Active Problem List   Diagnosis    Supervision of other normal pregnancy, antepartum    Hx of preeclampsia, prior pregnancy, currently pregnant    Tobacco abuse    Bicornuate uterus affecting pregnancy, antepartum    Marijuana use during pregnancy    Rubella non-immune status, antepartum    Abnormal glucose tolerance test (GTT) during pregnancy, antepartum    Maternal anemia in pregnancy, antepartum    Severe dysplasia of cervix (MAHESH III)    Recurrent pregnancy loss    Acute pyelonephritis         OB History    Para Term  AB Living   5 2 2  2 2   SAB IAB Ectopic Molar Multiple Live Births   2    0 2      # Outcome Date GA Lbr Jeremiah/2nd Weight Sex Type Anes PTL Lv   5 Current            4 Term 24 39w0d 08:29 / 00:14 3190 g (7 lb 0.5 oz) F Vag-Spont EPI N NOLAN   3 SAB 23 5w0d    SAB      2 Term 20 38w0d  2722 g (6 lb) M Vag-Spont   NOLAN   1 SAB  10w0d    SAB         Birth Comments: not sure      Obstetric Comments   2025 pt states had multiple losses up to 10.  Never had workup.            ROS:   Review of Systems - General ROS: negative  Psychological ROS: negative  Endocrine ROS: negative  Breast ROS: negative  Respiratory ROS: negative  Cardiovascular ROS: negative  Gastrointestinal ROS: negative  Genito-Urinary ROS: negative  Musculoskeletal ROS: negative  Neurological ROS: negative  Dermatological ROS: negative    Objective  Physical Exam:   Vitals:    25 1506   BP: 113/69       Uterine Size: size equals dates  FHT: 110-160 BPM    General appearance - alert, well appearing, and in no distress  Mental status - alert, oriented to person, place, and time  Abdomen- Soft, Gravid uterus, non-tender to  palpation  Musculoskeletal: negative for - gait disturbance or joint pain  Extremeties: negative swelling or cyanosis   Dermatological: negative rashes or skin lesions   GBS RV swab performed today    Assessment/Plan:   Diagnoses and all orders for this visit:    1. 36 weeks gestation of pregnancy (Primary)  -     CBC (No Diff)  -     Group B Strep Molecular by PCR - Swab, Vaginal/Rectum  -     POC Urinalysis Dipstick    2. Maternal anemia in pregnancy, antepartum  Comments:  iron supplementation    3. Rubella non-immune status, antepartum  Comments:  MMR PP    4. Hx of preeclampsia, prior pregnancy, currently pregnant  Comments:  ASA 81mg daily  Orders:  -     Comprehensive Metabolic Panel    5. Bicornuate uterus affecting pregnancy, antepartum    6. Abnormal glucose tolerance test (GTT) during pregnancy, antepartum  Assessment & Plan:  Patient again counseled to continue checking BS  Growth US 6//26-growth 58%, 6lb7oz                     Counseling:   OB precautions, leaking, VB, papo urias vs PTL/Labor  FKC  HTN precautions reviewed: HA, vision change, RUQ/epigastric pain, edema  Round Ligament Pain:  The uterus has several ligaments which provide support and keep the uterus in place. As the  uterus grows these ligaments are pulled and stretched which often causes sharp stabbing like pain in the inguinal area.   You may find a pregnancy support band helpful. Changing positions may also help. Yoga is a great way to cope with round ligament and low back pain in pregnancy.    Massage may also help with low back pain   Things to Consider at this Point in your Pregnancy:  Some women experience swelling in their feet during pregnancy. Compression stockings may help  Drink plenty of water and stay active   Make sure you are eating frequent small meals, nuts are a wonderful snack to keep with you            Return in about 1 week (around 7/3/2025) for Routine OB visit.      We have gone over prenatal care to include  the timing and content of visits. I informed her how to contact the office and/or on call person in the event of any problems and encouraged her to do so when she feels it is necessary.  We then spent time answering her questions which she indicated were answered to her satisfaction.    Megan Rivero DO  6/30/2025 10:54 EDT

## 2025-06-27 DIAGNOSIS — O99.019 MATERNAL ANEMIA IN PREGNANCY, ANTEPARTUM: Primary | ICD-10-CM

## 2025-06-27 LAB — GP B STREP DNA VAG+RECTUM QL NAA+PROBE: NEGATIVE

## 2025-06-27 RX ORDER — EPINEPHRINE 1 MG/ML
0.3 INJECTION, SOLUTION, CONCENTRATE INTRAVENOUS ONCE AS NEEDED
OUTPATIENT
Start: 2025-07-01

## 2025-06-27 RX ORDER — SODIUM CHLORIDE 9 MG/ML
20 INJECTION, SOLUTION INTRAVENOUS ONCE
OUTPATIENT
Start: 2025-07-01

## 2025-06-30 ENCOUNTER — TELEPHONE (OUTPATIENT)
Dept: OBSTETRICS AND GYNECOLOGY | Age: 30
End: 2025-06-30

## 2025-06-30 NOTE — TELEPHONE ENCOUNTER
Caller: Maylin Abraham    Relationship to patient: Self    Best call back number: [phone]    Patient is needing: PATIENT CALLED AND STATED THAT SHE DECIDED SHE WOULD LIKE TO BE INDUCED ON 7/16/25 AND WOULD LIKE TO GO AHEAD AND GET THAT SET UP

## 2025-06-30 NOTE — TELEPHONE ENCOUNTER
Caller: Maylin Abraham    Relationship to patient: Self    Best call back number: [phone]    Patient is needing: A CALL BACK FROM [facility], ABOUT SCHEDULING INDUCTION. PT WOULD PREFER TO SCHEDULE WITH DR INTERIANO OR DR MENDOZA, DOESN'T WANT TO SCHEDULE WITH DR MAGALLANES.

## 2025-07-01 ENCOUNTER — PATIENT OUTREACH (OUTPATIENT)
Dept: LABOR AND DELIVERY | Facility: HOSPITAL | Age: 30
End: 2025-07-01
Payer: COMMERCIAL

## 2025-07-01 NOTE — OUTREACH NOTE
[facility]  Unable to Reach    Questions/Answers      Flowsheet Row Responses   Pending Outreach Prenatal Check-in   Call Attempt First   Outcome No answer/busy, Left message   Next Call Attempt Date 07/03/25              Sydnee Petit RN  Maternity Nurse Navigator    7/1/2025, 12:57 EDT

## 2025-07-07 NOTE — PROGRESS NOTES
Routine Prenatal Visit     Subjective  Maylin Abraham is a 30 y.o.  at 37w6d here for her routine OB visit.   She is taking her prenatal vitamins.Reports no loss of fluid or vaginal bleeding. Patient doing well.     Pregnancy complicated by:     Patient Active Problem List   Diagnosis    Supervision of other normal pregnancy, antepartum    Hx of preeclampsia, prior pregnancy, currently pregnant    Tobacco abuse    Bicornuate uterus affecting pregnancy, antepartum    Marijuana use during pregnancy    Rubella non-immune status, antepartum    Abnormal glucose tolerance test (GTT) during pregnancy, antepartum    Maternal anemia in pregnancy, antepartum    Severe dysplasia of cervix (MAHESH III)    Recurrent pregnancy loss    Acute pyelonephritis         OB History    Para Term  AB Living   5 2 2  2 2   SAB IAB Ectopic Molar Multiple Live Births   2    0 2      # Outcome Date GA Lbr Jeremiah/2nd Weight Sex Type Anes PTL Lv   5 Current            4 Term 24 39w0d 08:29 / 00:14 3190 g (7 lb 0.5 oz) F Vag-Spont EPI N NOLAN   3 SAB 23 5w0d    SAB      2 Term 20 38w0d  2722 g (6 lb) M Vag-Spont   NOLAN   1 SAB  10w0d    SAB         Birth Comments: not sure      Obstetric Comments   2025 pt states had multiple losses up to 10.  Never had workup.            ROS:  General ROS: negative for - chills or fatigue  Genito-Urinary ROS: negative for  change in urinary stream, vaginal discharge     Objective  Physical Exam:   Vitals:    25 1338   BP: 117/70       Uterine Size: size equals dates  FHT: 110-160 BPM     SVE -3    Assessment/Plan:   Diagnoses and all orders for this visit:    1. Supervision of other normal pregnancy, antepartum (Primary)  -     POC Urinalysis Dipstick  -     sodium chloride 0.9 % flush 10 mL  -     sodium chloride 0.9 % flush 10 mL  -     sodium chloride 0.9 % infusion 40 mL  -     lidocaine PF 1% (XYLOCAINE) injection 0.5 mL  -     lactated ringers bolus 1,000  mL  -     lactated ringers infusion  -     acetaminophen (TYLENOL) tablet 650 mg  -     butorphanol (STADOL) injection 1 mg  -     morphine injection 2 mg  -     ondansetron ODT (ZOFRAN-ODT) disintegrating tablet 4 mg  -     ondansetron (ZOFRAN) injection 4 mg  -     promethazine (PHENERGAN) tablet 12.5 mg  -     metoclopramide (REGLAN) 10 mg in sodium chloride 0.9 % 50 mL IVPB  -     miSOPROStol (CYTOTEC) tablet 25 mcg  -     oxytocin (PITOCIN) 30 units in 0.9% sodium chloride 500 mL (premix)  -     terbutaline (BRETHINE) injection 0.25 mg  -     famotidine (PEPCID) injection 20 mg  -     famotidine (PEPCID) tablet 20 mg  -     Sod Citrate-Citric Acid (BICITRA) oral solution 30 mL  -     oxytocin (PITOCIN) 30 units in 0.9% sodium chloride 500 mL (premix)  -     oxytocin (PITOCIN) 30 units in 0.9% sodium chloride 500 mL (premix)  -     acetaminophen (TYLENOL) tablet 650 mg  -     ibuprofen (ADVIL,MOTRIN) tablet 800 mg  -     HYDROcodone-acetaminophen (NORCO) 5-325 MG per tablet 1 tablet; Refill: 0  -     methylergonovine (METHERGINE) injection 200 mcg  -     miSOPROStol (CYTOTEC) tablet 800 mcg  -     ondansetron ODT (ZOFRAN-ODT) disintegrating tablet 4 mg  -     ondansetron (ZOFRAN) injection 4 mg  -     promethazine (PHENERGAN) tablet 25 mg  -     famotidine (PEPCID) injection 20 mg  -     famotidine (PEPCID) tablet 20 mg    2. Maternal anemia in pregnancy, antepartum  -     Iron Profile    Other orders  -     Admit To Obstetrics Inpatient; Standing  -     Code Status and Medical Interventions: CPR (Attempt to Resuscitate); Full; Standing  -     Obtain Informed Consent; Standing  -     Place Sequential Compression Device; Standing  -     Maintain Sequential Compression Device; Standing  -     Vital Signs q 4 while awake; Standing  -     Vital Signs Per Hospital Policy; Standing  -     Confirm Presence of Amniotic Fluid if Patient Presents With SROM; Standing  -     Keep Exams to a Minimum in Patient With SROM;  Standing  -     Mini-Prep Prior to Delivery; Standing  -     Continuous Fetal Monitoring With NST on Admission and Prior to Initiation of Oxytocin.; Standing  -     External Uterine Contraction Monitoring; Standing  -     Notify Provider (Specified); Standing  -     Notify Provider of Tachysystole (Per Hospital Algorithm); Standing  -     Notify Provider if Membranes Ruptured, Bleeding Greater Than 1 Pad Per Hour, Fetal Heart Tone Abnormality or Severe Pain; Standing  -     May Ambulate if Membranes Intact or Head Engaged With Ruptured BOW or Normal Tracing for 20 Minutes; Standing  -     Patient May Shower; Standing  -     POC Glucose PRN; Standing  -     Intake and Output (If on Tocolytics); Standing  -     Cervical Exam Every 1-2 Hours When in Active Labor or At RN Discretion, Unless Contraindicated; Standing  -     Initiate Group Beta Strep (GBS) Prophylaxis Protocol, If Criteria Met; Standing  -     Bedside Ultrasound for Fetal Presentation; Standing  -     Position Change - For Intra-Uterine Resusitation for Hypertonus, HyperStimulation or Non-Reassuring Fetal Status; Standing  -     Insert Indwelling Urinary Catheter; Standing  -     Assess Need for Indwelling Urinary Catheter - Follow Removal Protocol; Standing  -     Urinary Catheter Care; Standing  -     Otero Bulb Cervical Ripening With Infusion; Standing  -     Diet: Liquid; Clear Liquid; Fluid Consistency: Thin (IDDSI 0); Standing  -     Inpatient Anesthesiology Consult; Standing  -     CBC (No Diff); Standing  -     Treponema pallidum AB w/Reflex RPR; Standing  -     Urine Drug Screen - Urine, Clean Catch; Standing  -     Type & Screen; Standing  -     Insert Peripheral IV; Standing  -     Saline Lock & Maintain IV Access; Standing  -     Notify Provider (Specified); Standing  -     Vital Signs Per Hospital Policy; Standing  -     Up as Tolerated; Standing  -     Fundal & Lochia Check; Standing  -     Apply Ice to Perineum; Standing  -     Bladder  Assessment; Standing  -     Diet: Regular/House; Fluid Consistency: Thin (IDDSI 0); Standing  -     Blood Gas, Arterial, Cord; Standing  -     Blood Gas, Venous, Cord; Standing  -     If indicated -- Please administer RH Immunoglobulin based on results of cord blood evaluation and fetal screen lab tests, pharmacy to dispense; Standing            Counseling:   OB precautions, leaking, VB, papo urias vs PTL/Labor  FKC  IOL scheduled  Round Ligament Pain:  The uterus has several ligaments which provide support and keep the uterus in place. As the  uterus grows these ligaments are pulled and stretched which often causes sharp stabbing like pain in the inguinal area.   You may find a pregnancy support band helpful. Changing positions may also help. Yoga is a great way to cope with round ligament and low back pain in pregnancy.    Massage may also help with low back pain   Things to Consider at this Point in your Pregnancy:  Some women experience swelling in their feet during pregnancy. Compression stockings may help  Drink plenty of water and stay active   Make sure you are eating frequent small meals, nuts are a wonderful snack to keep with you            Return in about 1 week (around 7/15/2025) for Routine OB visit.      We have gone over prenatal care to include the timing and content of visits. I informed her how to contact the office and/or on call person in the event of any problems and encouraged her to do so when she feels it is necessary.  We then spent time answering her questions which she indicated were answered to her satisfaction.    Marcy Rankin,   7/8/2025 13:59 EDT

## 2025-07-08 ENCOUNTER — ROUTINE PRENATAL (OUTPATIENT)
Dept: OBSTETRICS AND GYNECOLOGY | Age: 30
End: 2025-07-08
Payer: COMMERCIAL

## 2025-07-08 VITALS — WEIGHT: 185 LBS | DIASTOLIC BLOOD PRESSURE: 70 MMHG | SYSTOLIC BLOOD PRESSURE: 117 MMHG | BODY MASS INDEX: 32.77 KG/M2

## 2025-07-08 DIAGNOSIS — Z34.80 SUPERVISION OF OTHER NORMAL PREGNANCY, ANTEPARTUM: Primary | ICD-10-CM

## 2025-07-08 DIAGNOSIS — O99.019 MATERNAL ANEMIA IN PREGNANCY, ANTEPARTUM: ICD-10-CM

## 2025-07-08 LAB
GLUCOSE UR STRIP-MCNC: NEGATIVE MG/DL
IRON 24H UR-MRATE: 44 MCG/DL (ref 37–145)
IRON SATN MFR SERPL: 6 % (ref 20–50)
PROT UR STRIP-MCNC: NEGATIVE MG/DL
TIBC SERPL-MCNC: 703 MCG/DL (ref 298–536)
TRANSFERRIN SERPL-MCNC: 472 MG/DL (ref 200–360)

## 2025-07-08 PROCEDURE — 83540 ASSAY OF IRON: CPT | Performed by: OBSTETRICS & GYNECOLOGY

## 2025-07-08 PROCEDURE — 84466 ASSAY OF TRANSFERRIN: CPT | Performed by: OBSTETRICS & GYNECOLOGY

## 2025-07-08 RX ORDER — FAMOTIDINE 10 MG
20 TABLET ORAL ONCE AS NEEDED
OUTPATIENT
Start: 2025-07-08

## 2025-07-08 RX ORDER — ACETAMINOPHEN 325 MG/1
650 TABLET ORAL EVERY 4 HOURS PRN
OUTPATIENT
Start: 2025-07-08

## 2025-07-08 RX ORDER — METHYLERGONOVINE MALEATE 0.2 MG/ML
200 INJECTION INTRAVENOUS ONCE AS NEEDED
OUTPATIENT
Start: 2025-07-08

## 2025-07-08 RX ORDER — TERBUTALINE SULFATE 1 MG/ML
0.25 INJECTION SUBCUTANEOUS AS NEEDED
OUTPATIENT
Start: 2025-07-08

## 2025-07-08 RX ORDER — OXYTOCIN/0.9 % SODIUM CHLORIDE 30/500 ML
2-20 PLASTIC BAG, INJECTION (ML) INTRAVENOUS
OUTPATIENT
Start: 2025-07-08

## 2025-07-08 RX ORDER — MISOPROSTOL 100 UG/1
25 TABLET ORAL
OUTPATIENT
Start: 2025-07-08 | End: 2025-07-08

## 2025-07-08 RX ORDER — LIDOCAINE HYDROCHLORIDE 10 MG/ML
0.5 INJECTION, SOLUTION EPIDURAL; INFILTRATION; INTRACAUDAL; PERINEURAL ONCE AS NEEDED
OUTPATIENT
Start: 2025-07-08

## 2025-07-08 RX ORDER — OXYTOCIN/0.9 % SODIUM CHLORIDE 30/500 ML
250 PLASTIC BAG, INJECTION (ML) INTRAVENOUS CONTINUOUS
OUTPATIENT
Start: 2025-07-08 | End: 2025-07-08

## 2025-07-08 RX ORDER — PROMETHAZINE HYDROCHLORIDE 12.5 MG/1
12.5 TABLET ORAL EVERY 6 HOURS PRN
OUTPATIENT
Start: 2025-07-08

## 2025-07-08 RX ORDER — MISOPROSTOL 100 UG/1
800 TABLET ORAL AS NEEDED
OUTPATIENT
Start: 2025-07-08

## 2025-07-08 RX ORDER — PROMETHAZINE HYDROCHLORIDE 12.5 MG/1
25 TABLET ORAL EVERY 6 HOURS PRN
OUTPATIENT
Start: 2025-07-08

## 2025-07-08 RX ORDER — FAMOTIDINE 10 MG/ML
20 INJECTION, SOLUTION INTRAVENOUS 2 TIMES DAILY PRN
OUTPATIENT
Start: 2025-07-08

## 2025-07-08 RX ORDER — ONDANSETRON 2 MG/ML
4 INJECTION INTRAMUSCULAR; INTRAVENOUS EVERY 6 HOURS PRN
OUTPATIENT
Start: 2025-07-08

## 2025-07-08 RX ORDER — FAMOTIDINE 10 MG
20 TABLET ORAL 2 TIMES DAILY PRN
OUTPATIENT
Start: 2025-07-08

## 2025-07-08 RX ORDER — IBUPROFEN 200 MG
800 TABLET ORAL ONCE AS NEEDED
OUTPATIENT
Start: 2025-07-08

## 2025-07-08 RX ORDER — SODIUM CHLORIDE 0.9 % (FLUSH) 0.9 %
10 SYRINGE (ML) INJECTION AS NEEDED
OUTPATIENT
Start: 2025-07-08

## 2025-07-08 RX ORDER — ONDANSETRON 4 MG/1
4 TABLET, ORALLY DISINTEGRATING ORAL EVERY 6 HOURS PRN
OUTPATIENT
Start: 2025-07-08

## 2025-07-08 RX ORDER — BUTORPHANOL TARTRATE 1 MG/ML
1 INJECTION, SOLUTION INTRAMUSCULAR; INTRAVENOUS
OUTPATIENT
Start: 2025-07-08

## 2025-07-08 RX ORDER — SODIUM CHLORIDE 9 MG/ML
40 INJECTION, SOLUTION INTRAVENOUS AS NEEDED
OUTPATIENT
Start: 2025-07-08

## 2025-07-08 RX ORDER — OXYTOCIN/0.9 % SODIUM CHLORIDE 30/500 ML
999 PLASTIC BAG, INJECTION (ML) INTRAVENOUS ONCE
OUTPATIENT
Start: 2025-07-08 | End: 2025-07-08

## 2025-07-08 RX ORDER — SODIUM CHLORIDE, SODIUM LACTATE, POTASSIUM CHLORIDE, CALCIUM CHLORIDE 600; 310; 30; 20 MG/100ML; MG/100ML; MG/100ML; MG/100ML
125 INJECTION, SOLUTION INTRAVENOUS CONTINUOUS
OUTPATIENT
Start: 2025-07-08 | End: 2025-07-09

## 2025-07-08 RX ORDER — FAMOTIDINE 10 MG/ML
20 INJECTION, SOLUTION INTRAVENOUS ONCE AS NEEDED
OUTPATIENT
Start: 2025-07-08

## 2025-07-08 RX ORDER — ACETAMINOPHEN 325 MG/1
650 TABLET ORAL ONCE AS NEEDED
OUTPATIENT
Start: 2025-07-08

## 2025-07-08 RX ORDER — SODIUM CHLORIDE 0.9 % (FLUSH) 0.9 %
10 SYRINGE (ML) INJECTION EVERY 12 HOURS SCHEDULED
OUTPATIENT
Start: 2025-07-08

## 2025-07-08 RX ORDER — CITRIC ACID/SODIUM CITRATE 334-500MG
30 SOLUTION, ORAL ORAL ONCE AS NEEDED
OUTPATIENT
Start: 2025-07-08

## 2025-07-08 RX ORDER — HYDROCODONE BITARTRATE AND ACETAMINOPHEN 5; 325 MG/1; MG/1
1 TABLET ORAL EVERY 4 HOURS PRN
Refills: 0 | OUTPATIENT
Start: 2025-07-08 | End: 2025-07-15

## 2025-07-10 ENCOUNTER — HOSPITAL ENCOUNTER (OUTPATIENT)
Dept: INFUSION THERAPY | Facility: HOSPITAL | Age: 30
Discharge: HOME | End: 2025-07-10
Admitting: OBSTETRICS & GYNECOLOGY
Payer: COMMERCIAL

## 2025-07-10 VITALS
HEIGHT: 63 IN | BODY MASS INDEX: 32.93 KG/M2 | HEART RATE: 102 BPM | TEMPERATURE: 98.8 F | SYSTOLIC BLOOD PRESSURE: 130 MMHG | DIASTOLIC BLOOD PRESSURE: 68 MMHG | RESPIRATION RATE: 18 BRPM | OXYGEN SATURATION: 98 % | WEIGHT: 185.85 LBS

## 2025-07-10 DIAGNOSIS — O99.019 MATERNAL ANEMIA IN PREGNANCY, ANTEPARTUM: Primary | ICD-10-CM

## 2025-07-10 PROCEDURE — 25010000002 IRON SUCROSE PER 1 MG: Performed by: OBSTETRICS & GYNECOLOGY

## 2025-07-10 PROCEDURE — 96365 THER/PROPH/DIAG IV INF INIT: CPT

## 2025-07-10 PROCEDURE — 25810000003 SODIUM CHLORIDE 0.9 % SOLUTION: Performed by: OBSTETRICS & GYNECOLOGY

## 2025-07-10 PROCEDURE — 96366 THER/PROPH/DIAG IV INF ADDON: CPT

## 2025-07-10 RX ORDER — SODIUM CHLORIDE 9 MG/ML
20 INJECTION, SOLUTION INTRAVENOUS ONCE
Status: DISCONTINUED | OUTPATIENT
Start: 2025-07-10 | End: 2025-07-12 | Stop reason: HOSPADM

## 2025-07-10 RX ORDER — SODIUM CHLORIDE 9 MG/ML
20 INJECTION, SOLUTION INTRAVENOUS ONCE
OUTPATIENT
Start: 2025-07-17

## 2025-07-10 RX ADMIN — SODIUM CHLORIDE 300 MG: 9 INJECTION, SOLUTION INTRAVENOUS at 14:00

## 2025-07-15 ENCOUNTER — PATIENT OUTREACH (OUTPATIENT)
Dept: LABOR AND DELIVERY | Facility: HOSPITAL | Age: 30
End: 2025-07-15
Payer: COMMERCIAL

## 2025-07-15 NOTE — OUTREACH NOTE
[facility]  Check-In    Current Estimated Gestational Age: 38w6d      Questions/Answers      Flowsheet Row Responses   Best Method for Contacting Cell   Demographics Reviewed Yes   Able to keep appointments as scheduled Yes   Gender(s) and Name(s) Girl - Janel   Baby Active/Feeling Fetal Movemen Yes   How are you presently feeling? Ok   New Diagnosis Anemia   Supplies ready for baby Breast Pump, Car Seat, Clothing, Crib, Diapers, Feeding Supplies   Resource/Environmental Concerns None   Do you have any questions related to your care experience, your pregnancy, plans for delivery, any concerns, etc? No   Other Education Birth Plan          Discussed postpartum phone call.  Encouraged to go to labor and delivery for any s/s of labor  or concerns.     Sydnee Petit RN  Maternity Nurse Navigator    7/15/2025, 11:16 EDT

## 2025-07-16 NOTE — PROGRESS NOTES
Routine Prenatal Visit     Subjective  Maylin Abraham is a 30 y.o.  at 39w1d here for her routine OB visit.   She is taking her prenatal vitamins.Reports no loss of fluid or vaginal bleeding. Patient doing well.     Pregnancy complicated by:     Patient Active Problem List   Diagnosis    Supervision of other normal pregnancy, antepartum    Hx of preeclampsia, prior pregnancy, currently pregnant    Tobacco abuse    Bicornuate uterus affecting pregnancy, antepartum    Marijuana use during pregnancy    Rubella non-immune status, antepartum    Abnormal glucose tolerance test (GTT) during pregnancy, antepartum    Maternal anemia in pregnancy, antepartum    Severe dysplasia of cervix (MAHESH III)    Recurrent pregnancy loss    Acute pyelonephritis         OB History    Para Term  AB Living   5 2 2  2 2   SAB IAB Ectopic Molar Multiple Live Births   2    0 2      # Outcome Date GA Lbr Jeremiah/2nd Weight Sex Type Anes PTL Lv   5 Current            4 Term 24 39w0d 08:29 / 00:14 3190 g (7 lb 0.5 oz) F Vag-Spont EPI N NOLAN   3 SAB 23 5w0d    SAB      2 Term 20 38w0d  2722 g (6 lb) M Vag-Spont   NOLAN   1 SAB  10w0d    SAB         Birth Comments: not sure      Obstetric Comments   2025 pt states had multiple losses up to 10.  Never had workup.            ROS:  General ROS: negative for - chills or fatigue  Genito-Urinary ROS: negative for  change in urinary stream, vaginal discharge     Objective  Physical Exam:   Vitals:    25 1524   BP: 107/61       Uterine Size: size equals dates  FHT: 110-160 BPM     SVE 1-2/70/-3    Assessment/Plan:   Diagnoses and all orders for this visit:    1. 39 weeks gestation of pregnancy (Primary)  -     POC Urinalysis Dipstick    2. Supervision of other normal pregnancy, antepartum  -     POC Urinalysis Dipstick    3. Maternal anemia in pregnancy, antepartum    4. Abnormal glucose tolerance test (GTT) during pregnancy, antepartum            Counseling:    OB precautions, leaking, VB, papo urias vs PTL/Labor  FKC  IOL scheduled  IOL reviewed in detail.  R/B/A/SE/E.  All history reviewed and updated.  Pre-IOL exam performed.  Length can be 24-48+hrs.  PLAN: pit and cooks.  All questions answered.  She desires to proceed as planned.  She understands during early am the OB Hospitalist physicians will manage her labor and deliver prn any emergencies.    Round Ligament Pain:  The uterus has several ligaments which provide support and keep the uterus in place. As the  uterus grows these ligaments are pulled and stretched which often causes sharp stabbing like pain in the inguinal area.   You may find a pregnancy support band helpful. Changing positions may also help. Yoga is a great way to cope with round ligament and low back pain in pregnancy.    Massage may also help with low back pain   Things to Consider at this Point in your Pregnancy:  Some women experience swelling in their feet during pregnancy. Compression stockings may help  Drink plenty of water and stay active   Make sure you are eating frequent small meals, nuts are a wonderful snack to keep with you            Return in about 6 weeks (around 2025) for Postpartum- 6 weeks from .      We have gone over prenatal care to include the timing and content of visits. I informed her how to contact the office and/or on call person in the event of any problems and encouraged her to do so when she feels it is necessary.  We then spent time answering her questions which she indicated were answered to her satisfaction.    Marcy Rankin DO  2025 18:04 EDT

## 2025-07-17 ENCOUNTER — ROUTINE PRENATAL (OUTPATIENT)
Dept: OBSTETRICS AND GYNECOLOGY | Age: 30
End: 2025-07-17
Payer: COMMERCIAL

## 2025-07-17 ENCOUNTER — HOSPITAL ENCOUNTER (OUTPATIENT)
Dept: INFUSION THERAPY | Facility: HOSPITAL | Age: 30
Discharge: HOME | End: 2025-07-17
Admitting: OBSTETRICS & GYNECOLOGY
Payer: COMMERCIAL

## 2025-07-17 VITALS — BODY MASS INDEX: 33.52 KG/M2 | WEIGHT: 189.2 LBS | SYSTOLIC BLOOD PRESSURE: 107 MMHG | DIASTOLIC BLOOD PRESSURE: 61 MMHG

## 2025-07-17 VITALS
SYSTOLIC BLOOD PRESSURE: 106 MMHG | RESPIRATION RATE: 18 BRPM | DIASTOLIC BLOOD PRESSURE: 60 MMHG | HEART RATE: 84 BPM | TEMPERATURE: 98.8 F | OXYGEN SATURATION: 98 %

## 2025-07-17 DIAGNOSIS — O99.019 MATERNAL ANEMIA IN PREGNANCY, ANTEPARTUM: Primary | ICD-10-CM

## 2025-07-17 DIAGNOSIS — O99.019 MATERNAL ANEMIA IN PREGNANCY, ANTEPARTUM: ICD-10-CM

## 2025-07-17 DIAGNOSIS — Z34.80 SUPERVISION OF OTHER NORMAL PREGNANCY, ANTEPARTUM: ICD-10-CM

## 2025-07-17 DIAGNOSIS — O99.810 ABNORMAL GLUCOSE TOLERANCE TEST (GTT) DURING PREGNANCY, ANTEPARTUM: ICD-10-CM

## 2025-07-17 DIAGNOSIS — Z3A.39 39 WEEKS GESTATION OF PREGNANCY: Primary | ICD-10-CM

## 2025-07-17 LAB
GLUCOSE UR STRIP-MCNC: NEGATIVE MG/DL
PROT UR STRIP-MCNC: NEGATIVE MG/DL

## 2025-07-17 PROCEDURE — 25010000002 IRON SUCROSE PER 1 MG: Performed by: OBSTETRICS & GYNECOLOGY

## 2025-07-17 PROCEDURE — 96366 THER/PROPH/DIAG IV INF ADDON: CPT

## 2025-07-17 PROCEDURE — 25810000003 SODIUM CHLORIDE 0.9 % SOLUTION: Performed by: OBSTETRICS & GYNECOLOGY

## 2025-07-17 PROCEDURE — 96365 THER/PROPH/DIAG IV INF INIT: CPT

## 2025-07-17 RX ORDER — SODIUM CHLORIDE 9 MG/ML
20 INJECTION, SOLUTION INTRAVENOUS ONCE
OUTPATIENT
Start: 2025-07-24

## 2025-07-17 RX ADMIN — SODIUM CHLORIDE 300 MG: 9 INJECTION, SOLUTION INTRAVENOUS at 12:53

## 2025-07-17 NOTE — H&P
Obstetric History and Physical    Chief Complaint   Patient presents with    Routine Prenatal Visit       Subjective     Patient is a 30 y.o. female  currently at 39w3d, who presents for induction of labor at term.    Her prenatal care is complicated by anemia, abnormal glucose testing, pyelonephritis in pregnancy, UDS positive for THC, history of preeclampsia in a prior pregnancy, bicornuate uterus..  Her previous obstetric/gynecological history is noted for is non-contributory.    The following portions of the patients history were reviewed and updated as appropriate: current medications, allergies, past medical history, past surgical history, past family history, past social history, and problem list .       Prenatal Information:  Prenatal Results       Initial Prenatal Labs       Test Value Reference Range Date Time    Hemoglobin  11.6 g/dL 12.0 - 15.9 25 1454    Hematocrit  35.1 % 34.0 - 46.6 25 1454    Platelets  307 10*3/mm3 140 - 450 25 1454    Rubella IgG  <0.90 index Immune >0.99 25 1454    Hepatitis B SAg  Non-Reactive  Non-Reactive 25 1454    Hepatitis C Ab  Non-Reactive  Non-Reactive 25 1454    RPR  Non-Reactive  Non-Reactive 25 1454    T. Pallidum Ab   Non-Reactive  Non-Reactive 25 1130    ABO  A   25 1454    Rh  Positive   25 1454    Antibody Screen  Negative   25 1454    HIV  Non-Reactive  Non-Reactive 25 1454    Urine Culture  >100,000 CFU/mL Normal Urogenital Mary Ann   25 1130       No growth   25 1454    Gonorrhea  Negative  Negative 25 1454    Chlamydia  Negative  Negative 25 1454    TSH        HgB A1c         Varicella IgG        Hemoglobinopathy Fractionation  Comment   07/10/23 1528    Hemoglobinopathy (genetic testing)        Cystic fibrosis         Spinal muscular atrophy        Fragile X                  Fetal testing        Test Value Reference Range Date Time    NIPT        MSAFP         AFP-4                  2nd and 3rd Trimester       Test Value Reference Range Date Time    Hemoglobin (repeated)  8.7 g/dL 12.0 - 15.9 06/26/25 1540       9.7 g/dL 12.0 - 15.9 04/03/25 1130    Hematocrit (repeated)  27.6 % 34.0 - 46.6 06/26/25 1540       29.6 % 34.0 - 46.6 04/03/25 1130    Platelets   237 10*3/mm3 140 - 450 06/26/25 1540       272 10*3/mm3 140 - 450 04/03/25 1130       307 10*3/mm3 140 - 450 01/14/25 1454    1 hour GTT   149 mg/dL 65 - 139 04/03/25 1130    Antibody Screen (repeated)        3rd TM syphilis scrn (repeated)  RPR         3rd TM syphilis scrn (repeated) TP-Ab        3rd TM syphilis screen TB-Ab (FTA)        Syphilis cascade test TP-Ab (EIA)        Syphilis cascade TPPA        GTT Fasting        GTT 1 Hr        GTT 2 Hr        GTT 3 Hr        Group B Strep  Negative  Negative 06/26/25 1531              Other testing        Test Value Reference Range Date Time    Parvo IgG         CMV IgG                   Drug Screening       Test Value Reference Range Date Time    Amphetamine Screen  Negative  Negative 01/14/25 1454    Barbiturate Screen  Negative  Negative 01/14/25 1454    Benzodiazepine Screen  Negative  Negative 01/14/25 1454    Methadone Screen  Negative  Negative 01/14/25 1454    Phencyclidine Screen  Negative  Negative 01/14/25 1454    Opiates Screen  Negative  Negative 01/14/25 1454    THC Screen  Positive  Negative 01/14/25 1454    Cocaine Screen  Negative  Negative 01/14/25 1454    Propoxyphene Screen        Buprenorphine Screen  Negative  Negative 01/14/25 1454    Methamphetamine Screen  Negative  Negative 01/14/25 1454    Oxycodone Screen  Negative  Negative 01/14/25 1454    Tricyclic Antidepressants Screen  Negative  Negative 01/14/25 1454              Legend    ^: Historical                          External Prenatal Results       Pregnancy Outside Results - Transcribed From Office Records - See Scanned Records For Details       Test Value Date Time    ABO  A  01/14/25 1454     Rh  Positive  01/14/25 1454    Antibody Screen  Negative  01/14/25 1454    Varicella IgG       Rubella  <0.90 index 01/14/25 1454    Hgb  8.7 g/dL 06/26/25 1540       9.7 g/dL 04/03/25 1130       11.6 g/dL 01/14/25 1454    Hct  27.6 % 06/26/25 1540       29.6 % 04/03/25 1130       35.1 % 01/14/25 1454    HgB A1c        1h GTT  149 mg/dL 04/03/25 1130    3h GTT Fasting       3h GTT 1 hour       3h GTT 2 hour       3h GTT 3 hour        Gonorrhea (discrete)  Negative  01/14/25 1454    Chlamydia (discrete)  Negative  01/14/25 1454    RPR  Non-Reactive  01/14/25 1454    Syphils cascade: TP-Ab (FTA)  Non-Reactive  04/03/25 1130    TP-Ab  Non-Reactive  04/03/25 1130    TP-Ab (EIA)       TPPA       HBsAg  Non-Reactive  01/14/25 1454    Herpes Simplex Virus PCR       Herpes Simplex VIrus Culture       HIV  Non-Reactive  01/14/25 1454    Hep C RNA Quant PCR       Hep C Antibody  Non-Reactive  01/14/25 1454    AFP       NIPT       Cystic Fibrosis (Jeni)       Cystic Fibroisis        Spinal Muscular atrophy       Fragile X       Group B Strep  Negative  06/26/25 1531    GBS Susceptibility to Clindamycin       GBS Susceptibility to Erythromycin       Fetal Fibronectin       Genetic Testing, Maternal Blood                 Drug Screening       Test Value Date Time    Urine Drug Screen       Amphetamine Screen  Negative  01/14/25 1454    Barbiturate Screen  Negative  01/14/25 1454    Benzodiazepine Screen  Negative  01/14/25 1454    Methadone Screen  Negative  01/14/25 1454    Phencyclidine Screen  Negative  01/14/25 1454    Opiates Screen  Negative  01/14/25 1454    THC Screen  Positive  01/14/25 1454    Cocaine Screen       Propoxyphene Screen       Buprenorphine Screen  Negative  01/14/25 1454    Methamphetamine Screen       Oxycodone Screen  Negative  01/14/25 1454    Tricyclic Antidepressants Screen  Negative  01/14/25 1454              Legend    ^: Historical                             Past OB History:     OB History     Para Term  AB Living   5 2 2 0 2 2   SAB IAB Ectopic Molar Multiple Live Births   2 0 0 0 0 2      # Outcome Date GA Lbr Jeremiah/2nd Weight Sex Type Anes PTL Lv   5 Current            4 Term 24 39w0d 08:29  00:14 3190 g (7 lb 0.5 oz) F Vag-Spont EPI N NOLAN      Name: Charity Allen      Apgar1: 8  Apgar5: 9   3 SAB 23 5w0d    SAB      2 Term 20 38w0d  2722 g (6 lb) M Vag-Spont   NOLAN   1 SAB  10w0d    SAB         Birth Comments: not sure      Obstetric Comments   2025 pt states had multiple losses up to 10.  Never had workup.        Past Medical History: Past Medical History:   Diagnosis Date    Abnormal Pap smear of cervix     Anemia     Bicornuate uterus     on US, not confirmed    History of placenta previa w G1 resolved     HPV (human papilloma virus) infection     Hx of Preeclampsia w G1     Ovarian cyst     Recurrent pregnancy loss, antepartum condition or complication       Past Surgical History History reviewed. No pertinent surgical history.   Family History: Family History   Problem Relation Age of Onset    Breast cancer Neg Hx     Ovarian cancer Neg Hx     Colon cancer Neg Hx     Uterine cancer Neg Hx     Deep vein thrombosis Neg Hx     Pulmonary embolism Neg Hx       Social History:  reports that she has quit smoking. Her smoking use included cigarettes. She started smoking about 10 years ago. She has a 5.5 pack-year smoking history. She has been exposed to tobacco smoke. She has never used smokeless tobacco.   reports that she does not currently use alcohol.   reports no history of drug use.        General ROS: Pertinent items are noted in HPI    Objective       Vital Signs Range for the last 24 hours  Temperature: Temp:  [98.8 °F (37.1 °C)] 98.8 °F (37.1 °C)   Temp Source: Temp src: Oral   BP: BP: (106-119)/(60-70) 107/61   Pulse: Heart Rate:  [] 84   Respirations: Resp:  [18] 18   SPO2: SpO2:  [98 %] 98 %   O2 Amount (l/min):     O2 Devices     Weight: Weight:   [85.8 kg (189 lb 3.2 oz)] 85.8 kg (189 lb 3.2 oz)     Physical Examination: General appearance - alert, well appearing, and in no distress  Mental status - alert, oriented to person, place, and time  Chest - no labored breathing  Abdomen - soft, nontender, nondistended, gravid  Extremities - peripheral pulses normal, no pedal edema        GBS is negative      Assessment & Plan       * No active hospital problems. *        Assessment:  1.  Intrauterine pregnancy at 39w3d gestation   2.  induction of labor  for term  with unfavorable cervix  3.  Obstetrical history significant for is non-contributory.  4.  GBS status:   Group B Strep, DNA   Date Value Ref Range Status   2025 Negative Negative Final       Plan:  1. Vaginal anticipated, fetal and uterine monitoring  continuously and cervical ripening with  low dose Pitocin and intra-uterine hunt catheter  2. Plan of care has been reviewed with patient and patient agrees.   3.  Risks, benefits of treatment plan have been discussed.  4.  All questions have been answered.    Counseling:The patient was counseled on the risks, benefits and alternatives of Induction.  Risks reviewed, but are not limited to: bleeding, transfusion, fetal intolerance,  (possibly emergent),  and uterine rupture.  She declines expectant management or  and desires induction.  Reviewed risks w prematurity.  All her questions have been answered to her satisfaction and she desires to proceed.  Specifically with Cytotec, she understands that it is endorsed by the [employer], but not FDA approved for the induction of labor.      Marcy Rankin DO  2025  18:02 EDT

## 2025-07-18 ENCOUNTER — PREP FOR SURGERY (OUTPATIENT)
Dept: OTHER | Facility: HOSPITAL | Age: 30
End: 2025-07-18
Payer: COMMERCIAL

## 2025-07-19 ENCOUNTER — ANESTHESIA (OUTPATIENT)
Dept: LABOR AND DELIVERY | Facility: HOSPITAL | Age: 30
End: 2025-07-19
Payer: COMMERCIAL

## 2025-07-19 ENCOUNTER — HOSPITAL ENCOUNTER (INPATIENT)
Facility: HOSPITAL | Age: 30
LOS: 1 days | Discharge: HOME | End: 2025-07-20
Attending: STUDENT IN AN ORGANIZED HEALTH CARE EDUCATION/TRAINING PROGRAM | Admitting: STUDENT IN AN ORGANIZED HEALTH CARE EDUCATION/TRAINING PROGRAM
Payer: COMMERCIAL

## 2025-07-19 ENCOUNTER — ANESTHESIA EVENT (OUTPATIENT)
Dept: LABOR AND DELIVERY | Facility: HOSPITAL | Age: 30
End: 2025-07-19
Payer: COMMERCIAL

## 2025-07-19 ENCOUNTER — HOSPITAL ENCOUNTER (OUTPATIENT)
Dept: LABOR AND DELIVERY | Facility: HOSPITAL | Age: 30
Discharge: HOME | End: 2025-07-19
Payer: COMMERCIAL

## 2025-07-19 DIAGNOSIS — Z34.80 SUPERVISION OF OTHER NORMAL PREGNANCY, ANTEPARTUM: ICD-10-CM

## 2025-07-19 PROBLEM — Z34.90 ENCOUNTER FOR INDUCTION OF LABOR: Status: ACTIVE | Noted: 2025-07-19

## 2025-07-19 LAB
ABO GROUP BLD: NORMAL
AMPHET+METHAMPHET UR QL: NEGATIVE
AMPHETAMINES UR QL: NEGATIVE
BARBITURATES UR QL SCN: NEGATIVE
BENZODIAZ UR QL SCN: NEGATIVE
BLD GP AB SCN SERPL QL: NEGATIVE
BUPRENORPHINE SERPL-MCNC: NEGATIVE NG/ML
CANNABINOIDS SERPL QL: NEGATIVE
COCAINE UR QL: NEGATIVE
DEPRECATED RDW RBC AUTO: 44.6 FL (ref 37–54)
ERYTHROCYTE [DISTWIDTH] IN BLOOD BY AUTOMATED COUNT: 15.9 % (ref 12.3–15.4)
FENTANYL UR-MCNC: NEGATIVE NG/ML
HCT VFR BLD AUTO: 27.7 % (ref 34–46.6)
HGB BLD-MCNC: 8.5 G/DL (ref 12–15.9)
MCH RBC QN AUTO: 25.3 PG (ref 26.6–33)
MCHC RBC AUTO-ENTMCNC: 30.7 G/DL (ref 31.5–35.7)
MCV RBC AUTO: 82.4 FL (ref 79–97)
METHADONE UR QL SCN: NEGATIVE
OPIATES UR QL: NEGATIVE
OXYCODONE UR QL SCN: NEGATIVE
PCP UR QL SCN: NEGATIVE
PLATELET # BLD AUTO: 216 10*3/MM3 (ref 140–450)
PMV BLD AUTO: 14 FL (ref 6–12)
RBC # BLD AUTO: 3.36 10*6/MM3 (ref 3.77–5.28)
RH BLD: POSITIVE
T&S EXPIRATION DATE: NORMAL
TREPONEMA PALLIDUM IGG+IGM AB [PRESENCE] IN SERUM OR PLASMA BY IMMUNOASSAY: NORMAL
TRICYCLICS UR QL SCN: NEGATIVE
WBC NRBC COR # BLD AUTO: 12.99 10*3/MM3 (ref 3.4–10.8)

## 2025-07-19 PROCEDURE — 59025 FETAL NON-STRESS TEST: CPT

## 2025-07-19 PROCEDURE — 25810000003 LACTATED RINGERS PER 1000 ML: Performed by: STUDENT IN AN ORGANIZED HEALTH CARE EDUCATION/TRAINING PROGRAM

## 2025-07-19 PROCEDURE — 25010000002 FENTANYL CITRATE (PF) 50 MCG/ML SOLUTION: Performed by: NURSE ANESTHETIST, CERTIFIED REGISTERED

## 2025-07-19 PROCEDURE — 86900 BLOOD TYPING SEROLOGIC ABO: CPT | Performed by: STUDENT IN AN ORGANIZED HEALTH CARE EDUCATION/TRAINING PROGRAM

## 2025-07-19 PROCEDURE — 3E033VJ INTRODUCTION OF OTHER HORMONE INTO PERIPHERAL VEIN, PERCUTANEOUS APPROACH: ICD-10-PCS | Performed by: STUDENT IN AN ORGANIZED HEALTH CARE EDUCATION/TRAINING PROGRAM

## 2025-07-19 PROCEDURE — 86901 BLOOD TYPING SEROLOGIC RH(D): CPT | Performed by: STUDENT IN AN ORGANIZED HEALTH CARE EDUCATION/TRAINING PROGRAM

## 2025-07-19 PROCEDURE — 10907ZC DRAINAGE OF AMNIOTIC FLUID, THERAPEUTIC FROM PRODUCTS OF CONCEPTION, VIA NATURAL OR ARTIFICIAL OPENING: ICD-10-PCS | Performed by: STUDENT IN AN ORGANIZED HEALTH CARE EDUCATION/TRAINING PROGRAM

## 2025-07-19 PROCEDURE — 59410 OBSTETRICAL CARE: CPT | Performed by: STUDENT IN AN ORGANIZED HEALTH CARE EDUCATION/TRAINING PROGRAM

## 2025-07-19 PROCEDURE — 25010000002 ROPIVACAINE PER 1 MG: Performed by: NURSE ANESTHETIST, CERTIFIED REGISTERED

## 2025-07-19 PROCEDURE — 86850 RBC ANTIBODY SCREEN: CPT | Performed by: STUDENT IN AN ORGANIZED HEALTH CARE EDUCATION/TRAINING PROGRAM

## 2025-07-19 PROCEDURE — 80307 DRUG TEST PRSMV CHEM ANLYZR: CPT | Performed by: STUDENT IN AN ORGANIZED HEALTH CARE EDUCATION/TRAINING PROGRAM

## 2025-07-19 PROCEDURE — 86780 TREPONEMA PALLIDUM: CPT | Performed by: STUDENT IN AN ORGANIZED HEALTH CARE EDUCATION/TRAINING PROGRAM

## 2025-07-19 PROCEDURE — C1755 CATHETER, INTRASPINAL: HCPCS | Performed by: NURSE ANESTHETIST, CERTIFIED REGISTERED

## 2025-07-19 PROCEDURE — 85027 COMPLETE CBC AUTOMATED: CPT | Performed by: STUDENT IN AN ORGANIZED HEALTH CARE EDUCATION/TRAINING PROGRAM

## 2025-07-19 PROCEDURE — 51702 INSERT TEMP BLADDER CATH: CPT

## 2025-07-19 RX ORDER — FENTANYL CITRATE 50 UG/ML
INJECTION, SOLUTION INTRAMUSCULAR; INTRAVENOUS
Status: COMPLETED | OUTPATIENT
Start: 2025-07-19 | End: 2025-07-19

## 2025-07-19 RX ORDER — PROMETHAZINE HYDROCHLORIDE 25 MG/1
12.5 TABLET ORAL EVERY 6 HOURS PRN
Status: DISCONTINUED | OUTPATIENT
Start: 2025-07-19 | End: 2025-07-19 | Stop reason: HOSPADM

## 2025-07-19 RX ORDER — ONDANSETRON 4 MG/1
4 TABLET, ORALLY DISINTEGRATING ORAL EVERY 8 HOURS PRN
Status: DISCONTINUED | OUTPATIENT
Start: 2025-07-19 | End: 2025-07-20 | Stop reason: HOSPADM

## 2025-07-19 RX ORDER — FAMOTIDINE 10 MG/ML
20 INJECTION, SOLUTION INTRAVENOUS ONCE AS NEEDED
Status: DISCONTINUED | OUTPATIENT
Start: 2025-07-19 | End: 2025-07-19 | Stop reason: HOSPADM

## 2025-07-19 RX ORDER — PROMETHAZINE HYDROCHLORIDE 25 MG/1
25 TABLET ORAL EVERY 6 HOURS PRN
Status: DISCONTINUED | OUTPATIENT
Start: 2025-07-19 | End: 2025-07-19 | Stop reason: HOSPADM

## 2025-07-19 RX ORDER — IBUPROFEN 800 MG/1
800 TABLET, FILM COATED ORAL ONCE AS NEEDED
Status: DISCONTINUED | OUTPATIENT
Start: 2025-07-19 | End: 2025-07-19 | Stop reason: HOSPADM

## 2025-07-19 RX ORDER — OXYTOCIN/0.9 % SODIUM CHLORIDE 30/500 ML
250 PLASTIC BAG, INJECTION (ML) INTRAVENOUS CONTINUOUS
Status: ACTIVE | OUTPATIENT
Start: 2025-07-19 | End: 2025-07-19

## 2025-07-19 RX ORDER — SODIUM CHLORIDE 0.9 % (FLUSH) 0.9 %
10 SYRINGE (ML) INJECTION EVERY 12 HOURS SCHEDULED
Status: DISCONTINUED | OUTPATIENT
Start: 2025-07-19 | End: 2025-07-19 | Stop reason: HOSPADM

## 2025-07-19 RX ORDER — ACETAMINOPHEN 325 MG/1
650 TABLET ORAL EVERY 4 HOURS PRN
Status: DISCONTINUED | OUTPATIENT
Start: 2025-07-19 | End: 2025-07-19 | Stop reason: HOSPADM

## 2025-07-19 RX ORDER — LIDOCAINE HYDROCHLORIDE 10 MG/ML
0.5 INJECTION, SOLUTION EPIDURAL; INFILTRATION; INTRACAUDAL; PERINEURAL ONCE AS NEEDED
Status: DISCONTINUED | OUTPATIENT
Start: 2025-07-19 | End: 2025-07-19 | Stop reason: HOSPADM

## 2025-07-19 RX ORDER — OXYTOCIN/0.9 % SODIUM CHLORIDE 30/500 ML
999 PLASTIC BAG, INJECTION (ML) INTRAVENOUS ONCE
Status: DISCONTINUED | OUTPATIENT
Start: 2025-07-19 | End: 2025-07-20 | Stop reason: HOSPADM

## 2025-07-19 RX ORDER — FENTANYL/ROPIVACAINE/NS/PF 2MCG/ML-.2
PLASTIC BAG, INJECTION (ML) INJECTION
Status: COMPLETED
Start: 2025-07-19 | End: 2025-07-19

## 2025-07-19 RX ORDER — MORPHINE SULFATE 2 MG/ML
2 INJECTION, SOLUTION INTRAMUSCULAR; INTRAVENOUS
Status: DISCONTINUED | OUTPATIENT
Start: 2025-07-19 | End: 2025-07-19 | Stop reason: HOSPADM

## 2025-07-19 RX ORDER — SODIUM CHLORIDE 0.9 % (FLUSH) 0.9 %
10 SYRINGE (ML) INJECTION AS NEEDED
Status: DISCONTINUED | OUTPATIENT
Start: 2025-07-19 | End: 2025-07-19 | Stop reason: HOSPADM

## 2025-07-19 RX ORDER — IBUPROFEN 600 MG/1
600 TABLET, FILM COATED ORAL EVERY 6 HOURS PRN
Status: DISCONTINUED | OUTPATIENT
Start: 2025-07-19 | End: 2025-07-20 | Stop reason: HOSPADM

## 2025-07-19 RX ORDER — SODIUM CHLORIDE, SODIUM LACTATE, POTASSIUM CHLORIDE, CALCIUM CHLORIDE 600; 310; 30; 20 MG/100ML; MG/100ML; MG/100ML; MG/100ML
125 INJECTION, SOLUTION INTRAVENOUS CONTINUOUS
Status: DISCONTINUED | OUTPATIENT
Start: 2025-07-19 | End: 2025-07-19

## 2025-07-19 RX ORDER — ONDANSETRON 2 MG/ML
4 INJECTION INTRAMUSCULAR; INTRAVENOUS EVERY 6 HOURS PRN
Status: DISCONTINUED | OUTPATIENT
Start: 2025-07-19 | End: 2025-07-19 | Stop reason: HOSPADM

## 2025-07-19 RX ORDER — METOCLOPRAMIDE HYDROCHLORIDE 5 MG/ML
10 INJECTION INTRAMUSCULAR; INTRAVENOUS ONCE AS NEEDED
Status: DISCONTINUED | OUTPATIENT
Start: 2025-07-19 | End: 2025-07-19 | Stop reason: HOSPADM

## 2025-07-19 RX ORDER — OXYTOCIN/0.9 % SODIUM CHLORIDE 30/500 ML
2-20 PLASTIC BAG, INJECTION (ML) INTRAVENOUS
Status: DISCONTINUED | OUTPATIENT
Start: 2025-07-19 | End: 2025-07-19 | Stop reason: HOSPADM

## 2025-07-19 RX ORDER — BISACODYL 10 MG
10 SUPPOSITORY, RECTAL RECTAL DAILY PRN
Status: DISCONTINUED | OUTPATIENT
Start: 2025-07-20 | End: 2025-07-20 | Stop reason: HOSPADM

## 2025-07-19 RX ORDER — HYDROCODONE BITARTRATE AND ACETAMINOPHEN 5; 325 MG/1; MG/1
1 TABLET ORAL EVERY 4 HOURS PRN
Status: DISCONTINUED | OUTPATIENT
Start: 2025-07-19 | End: 2025-07-19 | Stop reason: HOSPADM

## 2025-07-19 RX ORDER — FAMOTIDINE 20 MG/1
20 TABLET, FILM COATED ORAL 2 TIMES DAILY PRN
Status: DISCONTINUED | OUTPATIENT
Start: 2025-07-19 | End: 2025-07-19 | Stop reason: HOSPADM

## 2025-07-19 RX ORDER — ROPIVACAINE HYDROCHLORIDE 2 MG/ML
INJECTION, SOLUTION EPIDURAL; INFILTRATION; PERINEURAL
Status: COMPLETED
Start: 2025-07-19 | End: 2025-07-19

## 2025-07-19 RX ORDER — CALCIUM CARBONATE 500 MG/1
2 TABLET, CHEWABLE ORAL 3 TIMES DAILY PRN
Status: DISCONTINUED | OUTPATIENT
Start: 2025-07-19 | End: 2025-07-20 | Stop reason: HOSPADM

## 2025-07-19 RX ORDER — SODIUM CHLORIDE 9 MG/ML
40 INJECTION, SOLUTION INTRAVENOUS AS NEEDED
Status: DISCONTINUED | OUTPATIENT
Start: 2025-07-19 | End: 2025-07-19 | Stop reason: HOSPADM

## 2025-07-19 RX ORDER — FAMOTIDINE 20 MG/1
20 TABLET, FILM COATED ORAL ONCE AS NEEDED
Status: DISCONTINUED | OUTPATIENT
Start: 2025-07-19 | End: 2025-07-19 | Stop reason: HOSPADM

## 2025-07-19 RX ORDER — METHYLERGONOVINE MALEATE 0.2 MG/ML
200 INJECTION INTRAVENOUS ONCE AS NEEDED
Status: DISCONTINUED | OUTPATIENT
Start: 2025-07-19 | End: 2025-07-19 | Stop reason: HOSPADM

## 2025-07-19 RX ORDER — EPHEDRINE SULFATE 50 MG/ML
5 INJECTION, SOLUTION INTRAVENOUS
Status: DISCONTINUED | OUTPATIENT
Start: 2025-07-19 | End: 2025-07-19 | Stop reason: HOSPADM

## 2025-07-19 RX ORDER — SODIUM CHLORIDE 0.9 % (FLUSH) 0.9 %
1-10 SYRINGE (ML) INJECTION AS NEEDED
Status: DISCONTINUED | OUTPATIENT
Start: 2025-07-19 | End: 2025-07-20 | Stop reason: HOSPADM

## 2025-07-19 RX ORDER — ONDANSETRON 4 MG/1
4 TABLET, ORALLY DISINTEGRATING ORAL EVERY 6 HOURS PRN
Status: DISCONTINUED | OUTPATIENT
Start: 2025-07-19 | End: 2025-07-19 | Stop reason: HOSPADM

## 2025-07-19 RX ORDER — MISOPROSTOL 200 UG/1
800 TABLET ORAL AS NEEDED
Status: DISCONTINUED | OUTPATIENT
Start: 2025-07-19 | End: 2025-07-19 | Stop reason: HOSPADM

## 2025-07-19 RX ORDER — ACETAMINOPHEN 325 MG/1
650 TABLET ORAL EVERY 6 HOURS PRN
Status: DISCONTINUED | OUTPATIENT
Start: 2025-07-19 | End: 2025-07-20 | Stop reason: HOSPADM

## 2025-07-19 RX ORDER — ROPIVACAINE HYDROCHLORIDE 2 MG/ML
INJECTION, SOLUTION EPIDURAL; INFILTRATION; PERINEURAL
Status: COMPLETED | OUTPATIENT
Start: 2025-07-19 | End: 2025-07-19

## 2025-07-19 RX ORDER — LIDOCAINE HYDROCHLORIDE AND EPINEPHRINE 15; 5 MG/ML; UG/ML
INJECTION, SOLUTION EPIDURAL
Status: COMPLETED | OUTPATIENT
Start: 2025-07-19 | End: 2025-07-19

## 2025-07-19 RX ORDER — PROMETHAZINE HYDROCHLORIDE 25 MG/1
12.5 TABLET ORAL EVERY 4 HOURS PRN
Status: DISCONTINUED | OUTPATIENT
Start: 2025-07-19 | End: 2025-07-20 | Stop reason: HOSPADM

## 2025-07-19 RX ORDER — OXYTOCIN/0.9 % SODIUM CHLORIDE 30/500 ML
125 PLASTIC BAG, INJECTION (ML) INTRAVENOUS ONCE AS NEEDED
Status: DISCONTINUED | OUTPATIENT
Start: 2025-07-19 | End: 2025-07-20 | Stop reason: HOSPADM

## 2025-07-19 RX ORDER — BUTORPHANOL TARTRATE 2 MG/ML
1 INJECTION, SOLUTION INTRAMUSCULAR; INTRAVENOUS
Status: DISCONTINUED | OUTPATIENT
Start: 2025-07-19 | End: 2025-07-19 | Stop reason: HOSPADM

## 2025-07-19 RX ORDER — MISOPROSTOL 100 MCG
25 TABLET ORAL ONCE
Status: COMPLETED | OUTPATIENT
Start: 2025-07-19 | End: 2025-07-19

## 2025-07-19 RX ORDER — TERBUTALINE SULFATE 1 MG/ML
0.25 INJECTION SUBCUTANEOUS AS NEEDED
Status: DISCONTINUED | OUTPATIENT
Start: 2025-07-19 | End: 2025-07-19 | Stop reason: HOSPADM

## 2025-07-19 RX ORDER — DOCUSATE SODIUM 100 MG/1
100 CAPSULE, LIQUID FILLED ORAL DAILY
Status: DISCONTINUED | OUTPATIENT
Start: 2025-07-19 | End: 2025-07-20 | Stop reason: HOSPADM

## 2025-07-19 RX ORDER — ACETAMINOPHEN 325 MG/1
650 TABLET ORAL ONCE AS NEEDED
Status: DISCONTINUED | OUTPATIENT
Start: 2025-07-19 | End: 2025-07-19 | Stop reason: HOSPADM

## 2025-07-19 RX ORDER — CITRIC ACID/SODIUM CITRATE 334-500MG
30 SOLUTION, ORAL ORAL ONCE AS NEEDED
Status: DISCONTINUED | OUTPATIENT
Start: 2025-07-19 | End: 2025-07-19 | Stop reason: HOSPADM

## 2025-07-19 RX ORDER — FAMOTIDINE 10 MG/ML
20 INJECTION, SOLUTION INTRAVENOUS 2 TIMES DAILY PRN
Status: DISCONTINUED | OUTPATIENT
Start: 2025-07-19 | End: 2025-07-19 | Stop reason: HOSPADM

## 2025-07-19 RX ORDER — FENTANYL CITRATE 50 UG/ML
INJECTION, SOLUTION INTRAMUSCULAR; INTRAVENOUS
Status: COMPLETED
Start: 2025-07-19 | End: 2025-07-19

## 2025-07-19 RX ADMIN — Medication 25 MCG: at 05:24

## 2025-07-19 RX ADMIN — SODIUM CHLORIDE, POTASSIUM CHLORIDE, SODIUM LACTATE AND CALCIUM CHLORIDE 125 ML/HR: 600; 310; 30; 20 INJECTION, SOLUTION INTRAVENOUS at 02:30

## 2025-07-19 RX ADMIN — Medication 10 ML/HR: at 08:49

## 2025-07-19 RX ADMIN — LIDOCAINE HYDROCHLORIDE AND EPINEPHRINE 3 ML: 15; 5 INJECTION, SOLUTION EPIDURAL at 08:42

## 2025-07-19 RX ADMIN — FENTANYL CITRATE 100 MCG: 50 INJECTION, SOLUTION INTRAMUSCULAR; INTRAVENOUS at 08:47

## 2025-07-19 RX ADMIN — ROPIVACAINE HYDROCHLORIDE 8 ML: 2 INJECTION, SOLUTION EPIDURAL; INFILTRATION; PERINEURAL at 08:47

## 2025-07-19 RX ADMIN — DOCUSATE SODIUM 100 MG: 100 CAPSULE, LIQUID FILLED ORAL at 14:39

## 2025-07-19 NOTE — PLAN OF CARE
Goal Outcome Evaluation:  Plan of Care Reviewed With: patient           Outcome Evaluation: pt up ad brock. voiding well. lochia wnl. pain controlled. bonding well with infant.

## 2025-07-19 NOTE — ANESTHESIA PREPROCEDURE EVALUATION
Anesthesia Evaluation     Patient summary reviewed   no history of anesthetic complications:   NPO Solid Status: N/A  NPO Liquid Status: N/A           Airway   Mallampati: II  TM distance: >3 FB  Neck ROM: full  No difficulty expected  Dental - normal exam     Pulmonary - normal exam   (+) a smoker Former,  Cardiovascular - normal exam    (+) hypertension      Neuro/Psych  (+) psychiatric history Anxiety  GI/Hepatic/Renal/Endo    (+) renal disease-    ROS Comment: Acute pylonyphritis     Musculoskeletal (-) negative ROS    Abdominal  - normal exam   Substance History   (+) drug use     OB/GYN    (+) Pregnant, Preeclampsia, pregnancy induced hypertension        Other          Other Comment: Materna anemia               Anesthesia Plan    ASA 2     epidural       Anesthetic plan, risks, benefits, and alternatives have been provided, discussed and informed consent has been obtained with: patient.  Pre-procedure education provided  Plan discussed with CRNA.    CODE STATUS:    Code Status (Patient has no pulse and is not breathing): CPR (Attempt to Resuscitate)  Medical Interventions (Patient has pulse or is breathing): Full

## 2025-07-19 NOTE — NURSING NOTE
Late Entry.    Pt arrived to unit due to contractions occurring every 5 minutes. Pt is scheduled for term elective IOL this morning per Dr. Rankin. Pt reports +FM, denies LOF, VB. Pt to don gown and provide urine sample. FOB present and supportive at b/s.

## 2025-07-19 NOTE — ANESTHESIA PROCEDURE NOTES
Labor Epidural    Pre-sedation assessment completed: 7/19/2025 8:32 AM    Patient reassessed immediately prior to procedure    Patient location during procedure: OB  Start Time: 7/19/2025 8:32 AM  Stop Time: 7/19/2025 8:52 AM  Performed By  CRNA/CAA: Brooke Santiago CRNA  Preanesthetic Checklist  Completed: patient identified, IV checked, site marked, risks and benefits discussed, surgical consent, monitors and equipment checked, pre-op evaluation and timeout performed  Prep:  Pt Position:sitting  Sterile Tech:cap, gloves, mask and sterile barrier  Prep:povidone-iodine 7.5% surgical scrub  Monitoring:blood pressure monitoring and continuous pulse oximetry  Epidural Block Procedure:  Approach:midline  Guidance:landmark technique and palpation technique  Location:L4-L5  Needle Type:Tuohy  Needle Gauge:17 G  Loss of Resistance Medium: saline  Loss of Resistance: 6cm  Cath Depth at skin:11 cm  Paresthesia: none  Aspiration:negative  Test Dose:negative  Medication: ropivacaine (NAROPIN) 0.2 % injection - Injection   8 mL - 7/19/2025 8:47:00 AM  fentaNYL citrate (PF) (SUBLIMAZE) injection - Epidural   100 mcg - 7/19/2025 8:47:00 AM  lidocaine 1.5%-EPINEPHrine 1:200,000 (XYLOCAINE W/EPI) injection - Epidural   3 mL - 7/19/2025 8:42:00 AM  Number of Attempts: 1  Post Assessment:  Dressing:biopatch applied, occlusive dressing applied and secured with tape  Pt Tolerance:patient tolerated the procedure well with no apparent complications  Complications:no

## 2025-07-19 NOTE — LACTATION NOTE
LC in to see this P3 patient. She  her others. She latched infant easily and deeply and no pain per her report. Good swallows seen. LC discussed with patient about  normal  breastfeeding behaviors and breastfeeding expectations for the next 2 days and to call as needed for lactation assistance . Patient showed good understanding.

## 2025-07-19 NOTE — PLAN OF CARE
Goal Outcome Evaluation:  Plan of Care Reviewed With: patient        Progress: improving  Outcome Evaluation: Pt here for IOL. FHTs stable. Vitals stable. Up ad brock. FOB present and supportive at b/s.

## 2025-07-19 NOTE — PLAN OF CARE
Goal Outcome Evaluation:         Patient delivered @1018. Pain is well controlled. VSS, currently doing well in recovery. Bonding well with baby.

## 2025-07-19 NOTE — PROGRESS NOTES
OB Intrapartum Note    Subjective: just received epidural    Objective:  Baseline: Normal 110-160 bpm  Variability:   Moderate/Normal (amplitude 6-25 bpm)  Accelerations: Present (32 weeks+) 15 x 15 bpm  Decelerations: None  Contractions:  Regular, q2min, q3min    Cervical exam:    Dilation: 5cm    Effacement: 90%    Station: 0/Engaged    Impression:    Category I  Reassuring fetus, AROM, Clear fluid    Plan:   Continue to monitor  Active labor positioning  Pelvis feels clinically adequate  Reviewed course/progress/plan with pt.  All questions answered to pts satisfaction.  Pt desires to proceed as outlined.  I have discussed in detail with patient the current plan to include, but NLT, appropriate expectations for labor and delivery, to include possible length of time expected for delivery and hospital stay.  All questions have been answered to pts satisfaction and pt desires to proceed as noted.        Electronically signed by Marcy Rankin DO, 07/19/25, 9:14 AM EDT.

## 2025-07-19 NOTE — L&D DELIVERY NOTE
"VAGINAL DELIVERY NOTE          Delivery Date: 2025  Delivery Time: 10:18 AM  Delivery Type: Spontaneous vaginal delivery  Gestational Age: 39w3d  Delivery Provider: Marcy Rankin  Induced Onset of Labor    Encounter for induction of labor       Anesthesia: Epidural    Infant: female infant   No birth weight on file.  APGARS: 8  @ 1 minute / 9  @ 5 minutes  Shoulder Dystocia: No      Placenta, Cord, and Fluid    Placenta Delivered:  Spontaneous at   2025 10:23 AM    Cord: 3 vessels present.   Nuchal Cord:  no   Cord Blood Obtained: Yes   Cord Gases Obtained:  Yes   Cord Gas Results: Venous:  No results found for: \"PHCVEN\", \"BECVEN\"    Arterial:  No results found for: \"PHCART\", \"BECART\"       Perineum: OBPERINEUM: Intact    EBL: Est. Blood Loss (mL): 150 mL (Filed from Delivery Summary) (25 1018)    Complications: None    Description of Procedure:   The patient is a  at 39w3d gestation who presented to L&D for induction of labor. The patient progressed to complete dilation and pushed with epidural anesthesia in the lithotomy position to deliver a live female infant in the YING position. There was no nuchal cord, and the amniotic fluid was clear. Atraumatic delivery of the shoulders, and body occurred in conjunction with maternal pushing efforts. The infant was placed on the patient's abdomen after delivery and bulb-suctioned as needed. The umbilical cord was clamped and cut and cord blood was collected. The intact placenta was delivered with manual massage of the uterine fundus. Uterine bleeding was controlled with fundal massage and oxytocin. No lacerations were noted. The vagina was explored with no retained sponges or foreign bodies noted. Both mother and  were in stable condition and recovering well in the delivery room.      Electronically signed by Marcy Rankin DO, 25, 10:32 AM EDT.   "

## 2025-07-20 VITALS
HEART RATE: 63 BPM | DIASTOLIC BLOOD PRESSURE: 69 MMHG | OXYGEN SATURATION: 99 % | RESPIRATION RATE: 18 BRPM | HEIGHT: 63 IN | BODY MASS INDEX: 33.49 KG/M2 | TEMPERATURE: 98 F | SYSTOLIC BLOOD PRESSURE: 116 MMHG | WEIGHT: 189 LBS

## 2025-07-20 RX ORDER — IBUPROFEN 800 MG/1
800 TABLET, FILM COATED ORAL EVERY 8 HOURS PRN
Qty: 30 TABLET | Refills: 1 | Status: SHIPPED | OUTPATIENT
Start: 2025-07-20

## 2025-07-20 RX ADMIN — DOCUSATE SODIUM 100 MG: 100 CAPSULE, LIQUID FILLED ORAL at 10:49

## 2025-07-20 NOTE — PLAN OF CARE
Goal Outcome Evaluation:              Outcome Evaluation: VSS, assessment WNL.  Pt is up ad brock, providing self & infant care.  Bonding well with infant.

## 2025-07-20 NOTE — PLAN OF CARE
Goal Outcome Evaluation:  Plan of Care Reviewed With: patient           Outcome Evaluation: pt up ad brock doing her own and infant care. bonding well, appts made . ready to dc home with infant today if okay with peds.

## 2025-07-20 NOTE — ANESTHESIA POSTPROCEDURE EVALUATION
Patient: Maylin Abraham    Procedure Summary       Date: 07/19/25 Room / Location:     Anesthesia Start: 0834 Anesthesia Stop: 1018    Procedure: LABOR ANALGESIA Diagnosis:     Scheduled Providers:  Provider: Brooke Santiago CRNA    Anesthesia Type: epidural ASA Status: 2            Anesthesia Type: epidural    Vitals  Vitals Value Taken Time   /69 07/20/25 07:14   Temp 36.7 °C (98 °F) 07/20/25 07:14   Pulse 63 07/20/25 07:14   Resp 18 07/20/25 07:14   SpO2 100 % 07/19/25 10:15   Vitals shown include unfiled device data.        Post Anesthesia Care and Evaluation    Patient location during evaluation: bedside  Patient participation: complete - patient participated  Level of consciousness: awake and alert  Pain score: 3  Pain management: adequate    Airway patency: patent  Anesthetic complications: No anesthetic complications  PONV Status: none  Cardiovascular status: acceptable  Respiratory status: acceptable  Hydration status: acceptable  Post Neuraxial Block status: Motor and sensory function returned to baseline and No signs or symptoms of PDPHNo anesthesia care post op

## 2025-07-20 NOTE — DISCHARGE INSTRUCTIONS
Pelvic rest for 6 weeks, no intercourse for 6 weeks, no tampons or douching for 6 weeks, nothing in the vagina for 6 weeks    No lifting more than 15 to 20 pounds for 2 weeks  No tub baths for 2 weeks, but may shower  Keep the incision clean and dry    Call the office or go to the emergency department for temperature greater than 100 °F, shortness of breath or chest pain, excessive nausea or vomiting, pain that is worsening despite current pain medications, redness, swelling, or drainage from the incision site, vaginal bleeding soaking a pad in less than 1 hour.

## 2025-07-20 NOTE — PROGRESS NOTES
Postpartum Progress Note     PPD# 1    Maylin Abraham is a 30 y.o.  who is postpartum day #1. Patient is doing well and denies any complaints. Patient states pain is controlled but cramping with breastfeeding.  Patient states lochia is moderate. Patient is breastfeed      Vitals:    25 0714   BP: 116/69   Pulse: 63   Resp: 18   Temp: 98 °F (36.7 °C)   SpO2:          Physical Exam:  HEENT:Normocephalic and atraumatic, NAD  Lungs: No labored breathing  Abdomen: Soft, appropriate tenderness to palpation, Uterine fundus firm and below the umbilicus  Extremities: Negative swelling or cyanosis, negative clonus    No results found for this or any previous visit (from the past 24 hours).]    ASSESSMENT/PLAN:    Patient is a 30 y.o.female who is PPD# 1 s/p    -Rh pos   -Encourage ambulation    -breastfeed   -Patient denies any complaints, continue postpartum care      Stable for DC home    Marcy Rankin DO  2025 09:00 EDT

## 2025-07-20 NOTE — DISCHARGE SUMMARY
[facility]  Delivery Discharge Summary    Patient Name: Maylin Abraham  : 1995  MRN: [id number]    Date of Admission: 2025  Date of Discharge:  2025   Primary Care Physician: María Elena Lew APRN  OB Clinician: Marcy Rankin DO    Procedures:  2025 - Vaginal, Spontaneous     Admitting Diagnosis:  Encounter for induction of labor [Z34.90]    Discharge Diagnosis:  Same as Admitting plus:   Pregnancy at 39w3d - Delivered     Antepartum complications: anemia, abnormal glucose, pyelonephritis    Delivery:   Date of Delivery: 2025  Time of Delivery: 10:18 AM  Delivered By:  Marcy Rankin  Delivery Type: Vaginal, Spontaneous   Anesthesia: Epidural   Intrapartum complications: None  Placenta: Spontaneous      Baby:  female infant;   Apgar:  8  @ 1 minute /   Apgar:  9  @ 5 minutes   Weight: 3350 g (7 lb 6.2 oz)   Length: 20.5    Feeding method: Breastfeeding Status: Yes    Discharge Details:     Discharge Medications        New Medications        Instructions Start Date   ibuprofen 800 MG tablet  Commonly known as: ADVIL,MOTRIN   800 mg, Oral, Every 8 Hours PRN             Continue These Medications        Instructions Start Date   ondansetron ODT 4 MG disintegrating tablet  Commonly known as: ZOFRAN-ODT   4 mg, Translingual, Every 8 Hours PRN             Stop These Medications      aspirin 81 MG EC tablet     glucose blood test strip            ASK your doctor about these medications        Instructions Start Date   hydrOXYzine 25 MG tablet  Commonly known as: ATARAX   25 mg, Oral, 3 Times Daily PRN      promethazine 25 MG tablet  Commonly known as: PHENERGAN   25 mg, Oral, Every 6 Hours PRN               Allergies   Allergen Reactions    Latex Hives    Penicillins Rash    Sulfa Antibiotics Unknown - Low Severity     Other reaction(s): Unknown - Low Severity       Discharge Disposition:   Home, self-care    Discharge Condition:  Good    Follow Up:  Future Appointments   Date Time Provider  [facility]   7/25/2025 12:45 PM ADRIEL Quiles MD [facility] CD [place] [facility]   9/2/2025  1:00 PM Megan Rivero DO [facility] OBG [address] [facility]         Plan:  Follow up 6 weeks for postpartum appt    Electronically signed by Marcy Rankin DO, 07/20/25, 8:45 AM EDT.

## 2025-07-20 NOTE — LACTATION NOTE
LC in to follow up with lactation progress. Mother continues to exclusively breastfeed and has pumped some. 15 ml at her bedside noted. Her nipples are tender and is using hydrogel pads. No obvious compression stripes just overall tender. Mother is happy baby is doing so well. Infant's weight loss and output are wdl. Patient is planning on discharge today. LC discussed normal infant output patterns to expect and if infant is not waking by 3 hours to wake and feed using measures shown in the hospital. LC discussed checking to make sure new medications are safe to breastfeed. LC discussed alcohol use and cigarette/second hand smoke around baby and breastfeeding and discussed the impact of street drugs on infants and breastfeeding. LC used the page in the breastfeeding guide to discuss harmful effects of these. Breastfeeding/Lactation expectations and anticipatory guidance discussed for the next two weeks . LC discussed nipple care, plugged ducts, engorgement, and breast infection. LC encouraged mom to see pediatrician two days from discharge for follow up. Patient has a breastpump for home use and LC discussed good pumping guidelines and normal expectations with pumping and storage and preparation of ebm for feedings. LC discussed breastfeeding/lactation resources including the local breastfeeding support group after discharge and when to call the doctor. Patient showed good understanding.

## 2025-07-25 ENCOUNTER — OFFICE VISIT (OUTPATIENT)
Dept: CARDIOLOGY | Facility: CLINIC | Age: 30
End: 2025-07-25
Payer: COMMERCIAL

## 2025-07-25 VITALS
SYSTOLIC BLOOD PRESSURE: 120 MMHG | DIASTOLIC BLOOD PRESSURE: 77 MMHG | WEIGHT: 175 LBS | HEIGHT: 63 IN | BODY MASS INDEX: 31.01 KG/M2 | HEART RATE: 75 BPM

## 2025-07-25 DIAGNOSIS — R06.09 DYSPNEA ON EXERTION: Primary | ICD-10-CM

## 2025-07-25 PROCEDURE — 99204 OFFICE O/P NEW MOD 45 MIN: CPT | Performed by: INTERNAL MEDICINE

## 2025-07-25 PROCEDURE — 93000 ELECTROCARDIOGRAM COMPLETE: CPT | Performed by: INTERNAL MEDICINE

## 2025-07-25 PROCEDURE — 1159F MED LIST DOCD IN RCRD: CPT | Performed by: INTERNAL MEDICINE

## 2025-07-25 PROCEDURE — 1160F RVW MEDS BY RX/DR IN RCRD: CPT | Performed by: INTERNAL MEDICINE

## 2025-07-25 NOTE — PROGRESS NOTES
Chief Complaint  BACK PAIN , Shortness of Breath, and Migraine    Subjective            Maylin Abraham presents to [facility] CARDIOLOGY  Shortness of Breath  Associated symptoms: no chest pain    Migraine    Associated symptoms: shortness of breath      Associated symptoms: no chest pain        30-year-old female.  She is approximately 7 days status post vaginal delivery of her second child.  She had an unremarkable labor and delivery based on records review.  The patient reports that she did have shortness of breath more than she expected about jail through pregnancy until now.  It has not resolved since delivery.  She did quit smoking jail through her pregnancy.  Her shortness of breath is with exertion but also when lying flat.  She has measured her blood pressures at home since delivery and it has been normal to somewhat low but she has not been treated for hypertension with this pregnancy and has not been getting high numbers at home.  No chest pain.  She has had some pulsations in her abdomen that occurred up until delivery but has not occurred since.  She does have headaches numbness in her arms and legs sometimes mostly at night.    PMH  Past Medical History:   Diagnosis Date    Abnormal Pap smear of cervix     Anemia     Bicornuate uterus     on US, not confirmed    History of placenta previa w G1 resolved     HPV (human papilloma virus) infection     Hx of Preeclampsia w G1     Ovarian cyst     Recurrent pregnancy loss, antepartum condition or complication          SURGICALHX  History reviewed. No pertinent surgical history.     SOC  Social History     Socioeconomic History    Marital status: Single    Number of children: 1    Years of education: 11    Highest education level: 11th grade   Tobacco Use    Smoking status: Former     Current packs/day: 0.50     Average packs/day: 0.5 packs/day for 10.9 years (5.5 ttl pk-yrs)     Types: Cigarettes     Start date: 8/18/2014     Passive  exposure: Current    Smokeless tobacco: Never   Vaping Use    Vaping status: Never Used   Substance and Sexual Activity    Alcohol use: Not Currently    Drug use: Never     Types: Marijuana    Sexual activity: Yes     Partners: Male     Birth control/protection: None         FAMHX  Family History   Problem Relation Age of Onset    No Known Problems Mother     No Known Problems Father     Breast cancer Neg Hx     Ovarian cancer Neg Hx     Colon cancer Neg Hx     Uterine cancer Neg Hx     Deep vein thrombosis Neg Hx     Pulmonary embolism Neg Hx           ALLERGY  Allergies   Allergen Reactions    Latex Hives    Penicillins Rash    Sulfa Antibiotics Unknown - Low Severity     Other reaction(s): Unknown - Low Severity        MEDSCURRENT    Current Outpatient Medications:     ondansetron ODT (ZOFRAN-ODT) 4 MG disintegrating tablet, Place 1 tablet on the tongue Every 8 (Eight) Hours As Needed for Nausea or Vomiting., Disp: 30 tablet, Rfl: 1    hydrOXYzine (ATARAX) 25 MG tablet, Take 1 tablet by mouth 3 (Three) Times a Day As Needed for Itching. (Patient not taking: Reported on 7/25/2025), Disp: 30 tablet, Rfl: 0    ibuprofen (ADVIL,MOTRIN) 800 MG tablet, Take 1 tablet by mouth Every 8 (Eight) Hours As Needed for Mild Pain (alternate with tylenol). (Patient not taking: Reported on 7/25/2025), Disp: 30 tablet, Rfl: 1    promethazine (PHENERGAN) 25 MG tablet, Take 1 tablet by mouth Every 6 (Six) Hours As Needed for Nausea or Vomiting. (Patient not taking: Reported on 5/29/2025), Disp: 30 tablet, Rfl: 0      Review of Systems   Constitutional: Positive for malaise/fatigue.   HENT: Negative.     Eyes: Negative.    Cardiovascular:  Positive for dyspnea on exertion. Negative for chest pain.   Respiratory:  Positive for shortness of breath.    Endocrine: Negative.    Hematologic/Lymphatic: Negative.    Skin: Negative.    Musculoskeletal: Negative.    Gastrointestinal: Negative.    Genitourinary: Negative.    Neurological:   "Positive for paresthesias.   Psychiatric/Behavioral: Negative.          Objective     /77   Pulse 75   Ht 160 cm (63\")   Wt 79.4 kg (175 lb)   BMI 31.00 kg/m²       General Appearance:   well developed  well nourished  HENT:   oropharynx moist  lips not cyanotic  Neck:  thyroid not enlarged  supple  Respiratory:  no respiratory distress  normal breath sounds  no rales  Cardiovascular:  no jugular venous distention  regular rhythm  apical impulse normal  S1 normal, S2 normal  no S3, no S4   no murmur  no rub, no thrill  carotid pulses normal; no bruit  pedal pulses normal  lower extremity edema: none    Musculoskeletal:  no clubbing of fingers.   normocephalic, head atraumatic  Skin:   warm, dry  Psychiatric:  judgement and insight appropriate  normal mood and affect      Result Review :     The following data was reviewed by: Garrett Quiles MD on 07/25/2025:    CMP          1/14/2025    14:54 6/26/2025    15:40   CMP   Glucose 77  73    BUN 5  8.0    Creatinine 0.41  0.44    EGFR 138.5  135.3    Sodium 135  137    Potassium 3.8  4.2    Chloride 102  104    Calcium 9.5  9.6    Total Protein 7.3  7.0    Albumin 4.1  3.6    Globulin 3.2  3.4    Total Bilirubin <0.2  0.3    Alkaline Phosphatase 77  124    AST (SGOT) 21  17    ALT (SGPT) 12  7    Albumin/Globulin Ratio 1.3  1.1    BUN/Creatinine Ratio 12.2  18.2    Anion Gap 9.1  11.4      CBC          4/3/2025    11:30 6/26/2025    15:40 7/19/2025    02:40   CBC   WBC 9.30  13.64  12.99    RBC 3.63  3.51  3.36    Hemoglobin 9.7  8.7  8.5    Hematocrit 29.6  27.6  27.7    MCV 81.5  78.6  82.4    MCH 26.7  24.8  25.3    MCHC 32.8  31.5  30.7    RDW 12.7  13.1  15.9    Platelets 272  237  216            Data reviewed : Hospital records reviewed       ECG 12 Lead    Date/Time: 7/25/2025 1:12 PM  Performed by: ADRIEL Quiles MD    Authorized by: ADRIEL Quiles MD  Comparison: not compared with previous ECG   Previous ECG: no previous ECG " available  Rhythm: sinus rhythm  Conduction: conduction normal  ST Segments: ST segments normal  T Waves: T waves normal  QRS axis: normal  Other: no other findings    Clinical impression: normal ECG                   Assessment and Plan        ASSESSMENT:  Encounter Diagnosis   Name Primary?    Dyspnea on exertion Yes         PLAN:    1.  Dyspnea on exertion, with some degree of orthopnea-she is 7 days postpartum from vaginal delivery.  She did not have evidence of hypertension with pregnancy or preeclampsia.  I did discuss with her that cardiomyopathy can rarely occur postpartum.  An echo will be scheduled next week to evaluate her LV function and rule out structural abnormalities.  Otherwise I suspect this is normal recovery from pregnancy thus far.  She will continue to monitor her blood pressures at home.  Keep her self well-hydrated.  We will discuss the echo results when available.  She is agreeable with this plan.        Patient was given instructions and counseling regarding her condition or for health maintenance advice. Please see specific information pulled into the AVS if appropriate.             ADRIEL Quiles MD  7/25/2025    13:10 EDT

## 2025-07-28 ENCOUNTER — PATIENT OUTREACH (OUTPATIENT)
Dept: LABOR AND DELIVERY | Facility: HOSPITAL | Age: 30
End: 2025-07-28
Payer: COMMERCIAL

## 2025-07-30 ENCOUNTER — TELEPHONE (OUTPATIENT)
Dept: LACTATION | Facility: HOSPITAL | Age: 30
End: 2025-07-30
Payer: COMMERCIAL

## 2025-07-30 NOTE — TELEPHONE ENCOUNTER
LC called to check with this patient and her breastfeeding progress. Patient states breastfeeding is going well and has no concerns. She states she has the lactation dept. number ([phone])  to call if she has any questions or needs.

## 2025-07-31 ENCOUNTER — PATIENT OUTREACH (OUTPATIENT)
Dept: LABOR AND DELIVERY | Facility: HOSPITAL | Age: 30
End: 2025-07-31
Payer: COMMERCIAL

## 2025-07-31 NOTE — OUTREACH NOTE
[facility]  Postpartum Check-In    Questions/Answers      Flowsheet Row Responses   Visit Setting Telephone   Best Method for Contacting Cell   OB Discharge Note Reviewed  Reviewed   OB Discharge Navigator Reviewed  Reviewed   OB Discharge Medications Reviewed  Reviewed    discharged home with mother? Yes   Current Pain Levels 0-10 0   At Rest Pain Levels 0-10 0   Pain level with activity 0-10 0   Acceptable Pain Level 0-10 0   Verbalized Emotional State Acceptance   Family/Support Network Family, Significant Other   Level of Involvement in Care Attentive, Interactive, Supportive   Do you feel comfortable in your relationship with your baby? Yes   Have members of your household adjusted to your baby? Yes   Is the baby's father supportive and/or involved with the baby? Yes   How does your partner feel about the baby? Happy, Involved   Do you feel safe at home, school and work? Yes   Are you in a relationship with someone who threatens you or hurts you? No   Do you have the resources to keep yourself and your baby healthy and safe? Yes   Lochia (per patient report) Brown-aide Red   Amount Spotting   Number of pads per day 3   Lochia Odor None   Is patient breastfeeding? Yes, pumping   Frequency PRN   Pumping Quantity 8-10oz   Postpartum Depression Screening Education Education Provided   Doctor Appointments: Education Provided   Breastfeeding Education Education Provided   Postpartum Care Education Education Provided   S & S to report Education Provided   Followup Appointments Made Yes   Well Child Visit Appointments Made Yes   Did you complete the visit? Yes   Were there any specific concerns? No   Umbilical Cord No reported signs or symptoms   Infant Feeding Method Expressed Breast Milk, Breast   Frequency of feedings q1-3h   Expressed milk PO (mL) 2-3oz   Expressed milk- frequency of feedings PRN   Number of wet diapers x 24 hours 10   Last BM x 24 hours 7-8   What safe sleep surface is available?  Bassinet   Are there stuffed animals, toys, pillows, quilts, blankets, wedges, positioners, bumpers or other loose bedding in the infant's sleeping environment? No   Where does the baby usually sleep? Bassinet, With an adult, child or pet  [Discussed safe sleep]   Does the baby ever share a sleep surface with a sibling, adult or pet? Yes   Comment Discussed safe sleep   Does the baby ever share a sleep surface in a bed, couch, recliner or other? Yes   Comment Discussed safe sleep   What position do you place your baby to sleep for naps? Back   What position do you place your baby to sleep at night Back   Are you and/or other caregivers smoking inside or outside the baby's home? No   Is the infant dressed appropiately for the temperature of the home? Yes   Do you use a clean, dry pacifier that is not attached to a string or stuffed animal? No            Review of Systems   All other systems reviewed and are negative.    Most Recent Lane  Depression Scale Score (EPDS)    Performed by a clinician: 3 (2025  2:00 AM)    Received via [facility] questionnaire: 0 (2025)     Doing well. No current questions, needs or concerns.     5 Ps Screen  complete      Sydnee Petit RN  Maternity Nurse Navigator    2025, 14:57 EDT

## 2025-08-07 ENCOUNTER — PATIENT OUTREACH (OUTPATIENT)
Dept: CALL CENTER | Facility: HOSPITAL | Age: 30
End: 2025-08-07
Payer: COMMERCIAL